# Patient Record
Sex: FEMALE | Race: WHITE | NOT HISPANIC OR LATINO | URBAN - METROPOLITAN AREA
[De-identification: names, ages, dates, MRNs, and addresses within clinical notes are randomized per-mention and may not be internally consistent; named-entity substitution may affect disease eponyms.]

---

## 2017-05-25 ENCOUNTER — OUTPATIENT (OUTPATIENT)
Dept: OUTPATIENT SERVICES | Facility: HOSPITAL | Age: 67
LOS: 1 days | Discharge: HOME | End: 2017-05-25

## 2017-06-28 DIAGNOSIS — Z13.820 ENCOUNTER FOR SCREENING FOR OSTEOPOROSIS: ICD-10-CM

## 2017-06-28 DIAGNOSIS — Z78.0 ASYMPTOMATIC MENOPAUSAL STATE: ICD-10-CM

## 2017-06-28 DIAGNOSIS — M81.0 AGE-RELATED OSTEOPOROSIS WITHOUT CURRENT PATHOLOGICAL FRACTURE: ICD-10-CM

## 2018-01-22 ENCOUNTER — OUTPATIENT (OUTPATIENT)
Dept: OUTPATIENT SERVICES | Facility: HOSPITAL | Age: 68
LOS: 1 days | Discharge: HOME | End: 2018-01-22

## 2018-01-22 DIAGNOSIS — R07.81 PLEURODYNIA: ICD-10-CM

## 2018-01-22 DIAGNOSIS — R05 COUGH: ICD-10-CM

## 2019-09-11 ENCOUNTER — OUTPATIENT (OUTPATIENT)
Dept: OUTPATIENT SERVICES | Facility: HOSPITAL | Age: 69
LOS: 1 days | Discharge: HOME | End: 2019-09-11
Payer: MEDICARE

## 2019-09-11 DIAGNOSIS — I83.813 VARICOSE VEINS OF BILATERAL LOWER EXTREMITIES WITH PAIN: ICD-10-CM

## 2019-09-11 DIAGNOSIS — R60.0 LOCALIZED EDEMA: ICD-10-CM

## 2019-09-11 PROCEDURE — 93970 EXTREMITY STUDY: CPT | Mod: 26

## 2019-11-30 ENCOUNTER — OUTPATIENT (OUTPATIENT)
Dept: OUTPATIENT SERVICES | Facility: HOSPITAL | Age: 69
LOS: 1 days | Discharge: HOME | End: 2019-11-30
Payer: MEDICARE

## 2019-11-30 DIAGNOSIS — Z12.31 ENCOUNTER FOR SCREENING MAMMOGRAM FOR MALIGNANT NEOPLASM OF BREAST: ICD-10-CM

## 2019-11-30 PROCEDURE — 77063 BREAST TOMOSYNTHESIS BI: CPT | Mod: 26

## 2019-11-30 PROCEDURE — 77067 SCR MAMMO BI INCL CAD: CPT | Mod: 26

## 2021-09-15 ENCOUNTER — OUTPATIENT (OUTPATIENT)
Dept: OUTPATIENT SERVICES | Facility: HOSPITAL | Age: 71
LOS: 1 days | Discharge: HOME | End: 2021-09-15
Payer: MEDICARE

## 2021-09-15 DIAGNOSIS — M54.5 LOW BACK PAIN: ICD-10-CM

## 2021-09-15 PROCEDURE — 72148 MRI LUMBAR SPINE W/O DYE: CPT | Mod: 26,MH

## 2022-01-24 PROBLEM — Z00.00 ENCOUNTER FOR PREVENTIVE HEALTH EXAMINATION: Status: ACTIVE | Noted: 2022-01-24

## 2022-01-27 ENCOUNTER — RESULT REVIEW (OUTPATIENT)
Age: 72
End: 2022-01-27

## 2022-01-27 ENCOUNTER — OUTPATIENT (OUTPATIENT)
Dept: OUTPATIENT SERVICES | Facility: HOSPITAL | Age: 72
LOS: 1 days | Discharge: HOME | End: 2022-01-27
Payer: MEDICARE

## 2022-01-27 DIAGNOSIS — Z12.31 ENCOUNTER FOR SCREENING MAMMOGRAM FOR MALIGNANT NEOPLASM OF BREAST: ICD-10-CM

## 2022-01-27 PROCEDURE — 77067 SCR MAMMO BI INCL CAD: CPT | Mod: 26

## 2022-01-27 PROCEDURE — 77063 BREAST TOMOSYNTHESIS BI: CPT | Mod: 26

## 2022-01-28 DIAGNOSIS — Z78.0 ASYMPTOMATIC MENOPAUSAL STATE: ICD-10-CM

## 2022-01-28 DIAGNOSIS — Z87.310 PERSONAL HISTORY OF (HEALED) OSTEOPOROSIS FRACTURE: ICD-10-CM

## 2022-01-28 DIAGNOSIS — Z13.820 ENCOUNTER FOR SCREENING FOR OSTEOPOROSIS: ICD-10-CM

## 2022-01-28 DIAGNOSIS — M81.0 AGE-RELATED OSTEOPOROSIS WITHOUT CURRENT PATHOLOGICAL FRACTURE: ICD-10-CM

## 2022-08-08 ENCOUNTER — NON-APPOINTMENT (OUTPATIENT)
Age: 72
End: 2022-08-08

## 2022-08-09 ENCOUNTER — TRANSCRIPTION ENCOUNTER (OUTPATIENT)
Age: 72
End: 2022-08-09

## 2022-08-12 ENCOUNTER — OUTPATIENT (OUTPATIENT)
Dept: INPATIENT UNIT | Facility: HOSPITAL | Age: 72
LOS: 1 days | Discharge: HOME | End: 2022-08-12

## 2022-08-12 ENCOUNTER — APPOINTMENT (OUTPATIENT)
Age: 72
End: 2022-08-12

## 2022-08-12 VITALS
OXYGEN SATURATION: 97 % | DIASTOLIC BLOOD PRESSURE: 60 MMHG | SYSTOLIC BLOOD PRESSURE: 119 MMHG | RESPIRATION RATE: 16 BRPM | TEMPERATURE: 99 F | HEART RATE: 82 BPM

## 2022-08-12 VITALS
DIASTOLIC BLOOD PRESSURE: 56 MMHG | RESPIRATION RATE: 16 BRPM | SYSTOLIC BLOOD PRESSURE: 115 MMHG | OXYGEN SATURATION: 97 % | HEART RATE: 80 BPM | TEMPERATURE: 99 F

## 2022-08-12 DIAGNOSIS — U07.1 COVID-19: ICD-10-CM

## 2022-08-12 RX ORDER — BEBTELOVIMAB 87.5 MG/ML
175 INJECTION, SOLUTION INTRAVENOUS ONCE
Refills: 0 | Status: COMPLETED | OUTPATIENT
Start: 2022-08-12 | End: 2022-08-12

## 2022-08-12 RX ADMIN — BEBTELOVIMAB 175 MILLIGRAM(S): 87.5 INJECTION, SOLUTION INTRAVENOUS at 09:00

## 2022-08-12 NOTE — CHART NOTE - NSCHARTNOTEFT_GEN_A_CORE
HPI: Patient is a 71yo Female w/ PMH of HTN, COPD not on home O2, scleroderma presented for monoclonal antibody treatment with Bebtelovimab today. Pt was tested positive 3 days ago, and has cough and HA currently. Allergic to PCN (rash) and vancomycin (anaphylaxis). Pt received 3 covid vaccines, last dosage not in past 14 days. Hemodynamically stable.       Allergies  penicillin (Rash)  vancomycin (Anaphylaxis)      T(C): 37.2 (12 Aug 2022 10:00), Max: 37.3 (12 Aug 2022 09:00)  T(F): 98.9 (12 Aug 2022 10:00), Max: 99.1 (12 Aug 2022 09:00)  HR: 80 (12 Aug 2022 10:00) (79 - 82)  BP: 115/56 (12 Aug 2022 10:00) (115/56 - 119/60)  RR: 16 (12 Aug 2022 10:00) (16 - 17)  SpO2: 97% (12 Aug 2022 10:00) (96% - 97%)      Physical Examination:  General: No acute distress.  Alert, oriented, interactive, nonfocal  PULM: Clear to auscultation bilaterally  CVS: Regular rate, no murmurs, rubs, or gallops  ABD: Soft, nondistended, nontender, normoactive bowel sounds, no masses  SKIN: Warm and well perfused, no rashes noted.        Plan:  I have reviewed the Bebtelovimab Emergency Use Authorization (EUA) and I have provided the patient or patient's caregiver with the following information:  1. FDA has authorized emergency use of Bebtelovimab which is not an FDA approved biological agent.  2. The patient or patient’s caregiver has the option to accept or refuse administration of Bebtelovimab  3. The significant known and potential risks and benefits of Bebtelovimab and the extent to which such risks and benefits are unknown.  4. Information on available alternative treatments and risks and benefits of those alternatives.  Informed consent was obtained. Answered all of patient's questions and concerns to their satisfaction. Patient verbalized understanding.  Monitor VS per protocol.  Insert peripheral IV.  Infuse NSS 25/mL IV continuously.  Administer Bebtelovimab IVP over 30 seconds.  Observe patient for 1 hour post infusion.    Disposition:  Patient tolerated infusion well, denies complaints of chest pain, shortness of breath, dizziness or palpitations. Discharge instructions given and fact sheet included.  Instructed patient to contact the call center or their PMD if they develop side effects at home.  Instructed the patient to call 911 and go to the emergency room if they develop symptoms of a severe allergic reaction. Patient verbalized understanding.  RN educated patient on the proper use of the incentive spirometer and the patient displayed return demonstration.  VSS and RN removed IV successfully.  Patient was discharged home in [facility].

## 2023-04-15 ENCOUNTER — NON-APPOINTMENT (OUTPATIENT)
Age: 73
End: 2023-04-15

## 2023-05-30 ENCOUNTER — RESULT CHARGE (OUTPATIENT)
Age: 73
End: 2023-05-30

## 2023-05-30 ENCOUNTER — APPOINTMENT (OUTPATIENT)
Dept: ORTHOPEDIC SURGERY | Facility: CLINIC | Age: 73
End: 2023-05-30
Payer: MEDICARE

## 2023-05-30 VITALS — HEIGHT: 60 IN | BODY MASS INDEX: 25.13 KG/M2 | WEIGHT: 128 LBS

## 2023-05-30 PROCEDURE — 73610 X-RAY EXAM OF ANKLE: CPT | Mod: RT

## 2023-05-30 PROCEDURE — 99203 OFFICE O/P NEW LOW 30 MIN: CPT

## 2023-05-30 PROCEDURE — 73630 X-RAY EXAM OF FOOT: CPT | Mod: RT

## 2023-05-30 NOTE — IMAGING
[de-identified] : On examination of the right foot and ankle, patient has generalized chronic swelling and deformity, patient has deformity due to previous surgeries to the foot and ankle.\par Patient has significant decreased range of motion, mild erythema and generalized swelling.\par Tenderness around the ankle joint.\par Able to apply weight over the right lower extremity.\par Sensation to light touch intact.\par \par X-ray of the right foot and right ankle were done in office today, this x-ray shows severe osteoarthritis to the ankle joint and hindfoot.  No acute fracture or dislocation noted.

## 2023-05-30 NOTE — DISCUSSION/SUMMARY
[de-identified] : IMPRESSION: Right ankle pain aggravated OA\par \par PLAN: Medrol dose pack\par ASPO brace\par Ice, rest\par \par FOLLOW-UP:2-3 weeks for the foot and ankle specialist\par \par \par
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
## 2023-06-14 ENCOUNTER — APPOINTMENT (OUTPATIENT)
Dept: ORTHOPEDIC SURGERY | Facility: CLINIC | Age: 73
End: 2023-06-14
Payer: MEDICARE

## 2023-06-14 VITALS — BODY MASS INDEX: 25.13 KG/M2 | HEIGHT: 60 IN | WEIGHT: 128 LBS

## 2023-06-14 DIAGNOSIS — S99.911A UNSPECIFIED INJURY OF RIGHT ANKLE, INITIAL ENCOUNTER: ICD-10-CM

## 2023-06-14 PROCEDURE — 99213 OFFICE O/P EST LOW 20 MIN: CPT

## 2023-06-14 NOTE — PHYSICAL EXAM
[de-identified] : She is alert oriented x3.  She is pleasant and cooperative that exam.  I examined her right lower extremity.  There is an almost circumferential laceration which is healed with surrounding the ankle.  She has a fixed equinus contracture at about 10 degrees shy of neutral.  There is no passive or active range of motion present at the ankle.

## 2023-06-14 NOTE — DISCUSSION/SUMMARY
[de-identified] : I had a lengthy discussion with the patient regarding her findings.  At this time I do not think any interventions warranted since she is improving.  However ultimately she might be a candidate for ultrasound-guided injections into the ankle given how narrowed her joint spaces.  She is in agreement with this plan.  All questions answered.
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
## 2023-07-20 ENCOUNTER — APPOINTMENT (OUTPATIENT)
Dept: ORTHOPEDIC SURGERY | Facility: CLINIC | Age: 73
End: 2023-07-20

## 2023-09-01 ENCOUNTER — NON-APPOINTMENT (OUTPATIENT)
Age: 73
End: 2023-09-01

## 2023-09-19 ENCOUNTER — APPOINTMENT (OUTPATIENT)
Dept: NEUROLOGY | Facility: CLINIC | Age: 73
End: 2023-09-19
Payer: MEDICARE

## 2023-09-19 VITALS
SYSTOLIC BLOOD PRESSURE: 124 MMHG | WEIGHT: 128 LBS | BODY MASS INDEX: 25.13 KG/M2 | HEART RATE: 82 BPM | DIASTOLIC BLOOD PRESSURE: 80 MMHG | HEIGHT: 60 IN

## 2023-09-19 DIAGNOSIS — M62.830 MUSCLE SPASM OF BACK: ICD-10-CM

## 2023-09-19 DIAGNOSIS — G43.709 CHRONIC MIGRAINE W/OUT AURA, NOT INTRACTABLE, W/OUT STATUS MIGRAINOSUS: ICD-10-CM

## 2023-09-19 PROCEDURE — 99213 OFFICE O/P EST LOW 20 MIN: CPT

## 2023-09-19 RX ORDER — DIGOXIN 125 UG/1
125 TABLET ORAL DAILY
Refills: 0 | Status: ACTIVE | COMMUNITY
Start: 2023-09-19

## 2023-10-04 ENCOUNTER — APPOINTMENT (OUTPATIENT)
Dept: CARDIOLOGY | Facility: CLINIC | Age: 73
End: 2023-10-04
Payer: MEDICARE

## 2023-10-04 VITALS
HEIGHT: 60 IN | HEART RATE: 75 BPM | BODY MASS INDEX: 25.13 KG/M2 | WEIGHT: 128 LBS | SYSTOLIC BLOOD PRESSURE: 120 MMHG | OXYGEN SATURATION: 93 % | DIASTOLIC BLOOD PRESSURE: 60 MMHG

## 2023-10-04 PROCEDURE — 93000 ELECTROCARDIOGRAM COMPLETE: CPT

## 2023-10-04 PROCEDURE — 99205 OFFICE O/P NEW HI 60 MIN: CPT | Mod: 25

## 2023-10-04 PROCEDURE — 99204 OFFICE O/P NEW MOD 45 MIN: CPT | Mod: 25

## 2023-10-04 RX ORDER — AMBRISENTAN 10 MG/1
10 TABLET, FILM COATED ORAL DAILY
Refills: 0 | Status: ACTIVE | COMMUNITY

## 2023-10-04 RX ORDER — TADALAFIL 20 MG/1
20 TABLET ORAL TWICE DAILY
Refills: 0 | Status: ACTIVE | COMMUNITY

## 2023-10-04 RX ORDER — SPIRONOLACTONE 50 MG/1
50 TABLET ORAL DAILY
Refills: 0 | Status: ACTIVE | COMMUNITY

## 2023-10-04 RX ORDER — HYDROXYCHLOROQUINE SULFATE 200 MG/1
200 TABLET, FILM COATED ORAL TWICE DAILY
Refills: 0 | Status: ACTIVE | COMMUNITY

## 2023-10-04 RX ORDER — METHYLPREDNISOLONE 4 MG/1
4 TABLET ORAL
Qty: 1 | Refills: 0 | Status: DISCONTINUED | COMMUNITY
Start: 2023-05-30 | End: 2023-10-04

## 2023-10-04 RX ORDER — ALPRAZOLAM 0.25 MG/1
0.25 TABLET ORAL TWICE DAILY
Refills: 0 | Status: ACTIVE | COMMUNITY

## 2023-10-04 RX ORDER — METHOCARBAMOL 500 MG/1
500 TABLET, FILM COATED ORAL 3 TIMES DAILY
Qty: 90 | Refills: 0 | Status: DISCONTINUED | COMMUNITY
Start: 2023-09-19 | End: 2023-10-04

## 2023-10-04 RX ORDER — FLUTICASONE FUROATE, UMECLIDINIUM BROMIDE AND VILANTEROL TRIFENATATE 100; 62.5; 25 UG/1; UG/1; UG/1
100-62.5-25 POWDER RESPIRATORY (INHALATION) DAILY
Refills: 0 | Status: ACTIVE | COMMUNITY

## 2023-10-05 LAB
ALBUMIN SERPL ELPH-MCNC: 4.7 G/DL
ALP BLD-CCNC: 86 U/L
ALT SERPL-CCNC: 10 U/L
ANION GAP SERPL CALC-SCNC: 12 MMOL/L
AST SERPL-CCNC: 20 U/L
BILIRUB SERPL-MCNC: <0.2 MG/DL
BUN SERPL-MCNC: 21 MG/DL
CALCIUM SERPL-MCNC: 9.8 MG/DL
CHLORIDE SERPL-SCNC: 102 MMOL/L
CO2 SERPL-SCNC: 25 MMOL/L
CREAT SERPL-MCNC: 0.8 MG/DL
EGFR: 77 ML/MIN/1.73M2
FERRITIN SERPL-MCNC: 25 NG/ML
GLUCOSE SERPL-MCNC: 91 MG/DL
HCT VFR BLD CALC: 35 %
HGB BLD-MCNC: 10.5 G/DL
IRON SATN MFR SERPL: 34 %
IRON SERPL-MCNC: 111 UG/DL
MAGNESIUM SERPL-MCNC: 2.2 MG/DL
MCHC RBC-ENTMCNC: 29.8 PG
MCHC RBC-ENTMCNC: 30 G/DL
MCV RBC AUTO: 99.4 FL
NT-PROBNP SERPL-MCNC: 176 PG/ML
PLATELET # BLD AUTO: 209 K/UL
PMV BLD: 11.1 FL
POTASSIUM SERPL-SCNC: 4.1 MMOL/L
PROT SERPL-MCNC: 7 G/DL
RBC # BLD: 3.52 M/UL
RBC # FLD: 16.1 %
SODIUM SERPL-SCNC: 139 MMOL/L
TIBC SERPL-MCNC: 322 UG/DL
TRANSFERRIN SERPL-MCNC: 259 MG/DL
UIBC SERPL-MCNC: 211 UG/DL
WBC # FLD AUTO: 4.39 K/UL

## 2023-11-01 ENCOUNTER — APPOINTMENT (OUTPATIENT)
Dept: HEMATOLOGY ONCOLOGY | Facility: CLINIC | Age: 73
End: 2023-11-01
Payer: MEDICARE

## 2023-11-01 ENCOUNTER — OUTPATIENT (OUTPATIENT)
Dept: OUTPATIENT SERVICES | Facility: HOSPITAL | Age: 73
LOS: 1 days | End: 2023-11-01
Payer: MEDICARE

## 2023-11-01 ENCOUNTER — APPOINTMENT (OUTPATIENT)
Dept: INFUSION THERAPY | Facility: CLINIC | Age: 73
End: 2023-11-01
Payer: MEDICARE

## 2023-11-01 VITALS
RESPIRATION RATE: 18 BRPM | SYSTOLIC BLOOD PRESSURE: 127 MMHG | WEIGHT: 128 LBS | HEART RATE: 87 BPM | HEIGHT: 60 IN | DIASTOLIC BLOOD PRESSURE: 78 MMHG | TEMPERATURE: 98.6 F | BODY MASS INDEX: 25.13 KG/M2

## 2023-11-01 DIAGNOSIS — D64.9 ANEMIA, UNSPECIFIED: ICD-10-CM

## 2023-11-01 DIAGNOSIS — Z79.899 OTHER LONG TERM (CURRENT) DRUG THERAPY: ICD-10-CM

## 2023-11-01 DIAGNOSIS — Z78.9 OTHER SPECIFIED HEALTH STATUS: ICD-10-CM

## 2023-11-01 PROCEDURE — 99204 OFFICE O/P NEW MOD 45 MIN: CPT

## 2023-11-01 PROCEDURE — 99204 OFFICE O/P NEW MOD 45 MIN: CPT | Mod: 25

## 2023-11-01 PROCEDURE — 96365 THER/PROPH/DIAG IV INF INIT: CPT

## 2023-11-01 RX ORDER — IRON SUCROSE 20 MG/ML
200 INJECTION, SOLUTION INTRAVENOUS ONCE
Refills: 0 | Status: COMPLETED | OUTPATIENT
Start: 2023-11-01 | End: 2023-11-01

## 2023-11-01 RX ADMIN — IRON SUCROSE 110 MILLIGRAM(S): 20 INJECTION, SOLUTION INTRAVENOUS at 17:15

## 2023-11-01 RX ADMIN — IRON SUCROSE 200 MILLIGRAM(S): 20 INJECTION, SOLUTION INTRAVENOUS at 18:15

## 2023-11-02 DIAGNOSIS — D64.9 ANEMIA, UNSPECIFIED: ICD-10-CM

## 2023-11-09 ENCOUNTER — OUTPATIENT (OUTPATIENT)
Dept: OUTPATIENT SERVICES | Facility: HOSPITAL | Age: 73
LOS: 1 days | End: 2023-11-09
Payer: MEDICARE

## 2023-11-09 ENCOUNTER — APPOINTMENT (OUTPATIENT)
Dept: INFUSION THERAPY | Facility: CLINIC | Age: 73
End: 2023-11-09

## 2023-11-09 VITALS
RESPIRATION RATE: 18 BRPM | DIASTOLIC BLOOD PRESSURE: 63 MMHG | SYSTOLIC BLOOD PRESSURE: 140 MMHG | TEMPERATURE: 98.1 F | OXYGEN SATURATION: 95 % | HEART RATE: 79 BPM

## 2023-11-09 DIAGNOSIS — D64.9 ANEMIA, UNSPECIFIED: ICD-10-CM

## 2023-11-09 PROCEDURE — 96365 THER/PROPH/DIAG IV INF INIT: CPT

## 2023-11-09 RX ORDER — IRON SUCROSE 20 MG/ML
200 INJECTION, SOLUTION INTRAVENOUS ONCE
Refills: 0 | Status: COMPLETED | OUTPATIENT
Start: 2023-11-09 | End: 2023-11-09

## 2023-11-09 RX ADMIN — IRON SUCROSE 200 MILLIGRAM(S): 20 INJECTION, SOLUTION INTRAVENOUS at 12:20

## 2023-11-09 RX ADMIN — IRON SUCROSE 110 MILLIGRAM(S): 20 INJECTION, SOLUTION INTRAVENOUS at 11:40

## 2023-11-14 ENCOUNTER — APPOINTMENT (OUTPATIENT)
Dept: NEUROLOGY | Facility: CLINIC | Age: 73
End: 2023-11-14

## 2023-11-16 ENCOUNTER — APPOINTMENT (OUTPATIENT)
Dept: INFUSION THERAPY | Facility: CLINIC | Age: 73
End: 2023-11-16

## 2023-11-16 ENCOUNTER — OUTPATIENT (OUTPATIENT)
Dept: OUTPATIENT SERVICES | Facility: HOSPITAL | Age: 73
LOS: 1 days | End: 2023-11-16
Payer: MEDICARE

## 2023-11-16 VITALS
DIASTOLIC BLOOD PRESSURE: 81 MMHG | OXYGEN SATURATION: 97 % | RESPIRATION RATE: 18 BRPM | HEART RATE: 78 BPM | SYSTOLIC BLOOD PRESSURE: 115 MMHG | TEMPERATURE: 97.8 F

## 2023-11-16 DIAGNOSIS — D64.9 ANEMIA, UNSPECIFIED: ICD-10-CM

## 2023-11-16 PROCEDURE — 96365 THER/PROPH/DIAG IV INF INIT: CPT

## 2023-11-16 RX ORDER — IRON SUCROSE 20 MG/ML
200 INJECTION, SOLUTION INTRAVENOUS ONCE
Refills: 0 | Status: COMPLETED | OUTPATIENT
Start: 2023-11-16 | End: 2023-11-16

## 2023-11-16 RX ADMIN — IRON SUCROSE 110 MILLIGRAM(S): 20 INJECTION, SOLUTION INTRAVENOUS at 11:19

## 2023-11-16 RX ADMIN — IRON SUCROSE 200 MILLIGRAM(S): 20 INJECTION, SOLUTION INTRAVENOUS at 12:21

## 2023-11-21 ENCOUNTER — APPOINTMENT (OUTPATIENT)
Dept: INFUSION THERAPY | Facility: CLINIC | Age: 73
End: 2023-11-21

## 2023-11-21 ENCOUNTER — OUTPATIENT (OUTPATIENT)
Dept: OUTPATIENT SERVICES | Facility: HOSPITAL | Age: 73
LOS: 1 days | End: 2023-11-21
Payer: MEDICARE

## 2023-11-21 VITALS — TEMPERATURE: 98 F | HEART RATE: 79 BPM | SYSTOLIC BLOOD PRESSURE: 115 MMHG | DIASTOLIC BLOOD PRESSURE: 58 MMHG

## 2023-11-21 DIAGNOSIS — D64.9 ANEMIA, UNSPECIFIED: ICD-10-CM

## 2023-11-21 PROCEDURE — 96365 THER/PROPH/DIAG IV INF INIT: CPT

## 2023-11-21 RX ORDER — IRON SUCROSE 20 MG/ML
200 INJECTION, SOLUTION INTRAVENOUS ONCE
Refills: 0 | Status: COMPLETED | OUTPATIENT
Start: 2023-11-21 | End: 2023-11-21

## 2023-11-21 RX ADMIN — IRON SUCROSE 200 MILLIGRAM(S): 20 INJECTION, SOLUTION INTRAVENOUS at 10:08

## 2023-11-21 RX ADMIN — IRON SUCROSE 110 MILLIGRAM(S): 20 INJECTION, SOLUTION INTRAVENOUS at 09:38

## 2023-11-30 ENCOUNTER — APPOINTMENT (OUTPATIENT)
Dept: INFUSION THERAPY | Facility: CLINIC | Age: 73
End: 2023-11-30

## 2023-11-30 ENCOUNTER — OUTPATIENT (OUTPATIENT)
Dept: OUTPATIENT SERVICES | Facility: HOSPITAL | Age: 73
LOS: 1 days | End: 2023-11-30
Payer: MEDICARE

## 2023-11-30 VITALS — HEART RATE: 71 BPM | DIASTOLIC BLOOD PRESSURE: 62 MMHG | TEMPERATURE: 98 F | SYSTOLIC BLOOD PRESSURE: 132 MMHG

## 2023-11-30 DIAGNOSIS — D64.9 ANEMIA, UNSPECIFIED: ICD-10-CM

## 2023-11-30 PROCEDURE — 96365 THER/PROPH/DIAG IV INF INIT: CPT

## 2023-11-30 RX ORDER — IRON SUCROSE 20 MG/ML
200 INJECTION, SOLUTION INTRAVENOUS ONCE
Refills: 0 | Status: COMPLETED | OUTPATIENT
Start: 2023-11-30 | End: 2023-11-30

## 2023-11-30 RX ADMIN — IRON SUCROSE 110 MILLIGRAM(S): 20 INJECTION, SOLUTION INTRAVENOUS at 11:43

## 2023-11-30 RX ADMIN — IRON SUCROSE 200 MILLIGRAM(S): 20 INJECTION, SOLUTION INTRAVENOUS at 12:45

## 2024-01-02 ENCOUNTER — OUTPATIENT (OUTPATIENT)
Dept: OUTPATIENT SERVICES | Facility: HOSPITAL | Age: 74
LOS: 1 days | End: 2024-01-02
Payer: MEDICARE

## 2024-01-02 ENCOUNTER — LABORATORY RESULT (OUTPATIENT)
Age: 74
End: 2024-01-02

## 2024-01-02 ENCOUNTER — APPOINTMENT (OUTPATIENT)
Dept: HEMATOLOGY ONCOLOGY | Facility: CLINIC | Age: 74
End: 2024-01-02

## 2024-01-02 DIAGNOSIS — D64.9 ANEMIA, UNSPECIFIED: ICD-10-CM

## 2024-01-02 LAB
HCT VFR BLD CALC: 30 %
HGB BLD-MCNC: 9.6 G/DL
MCHC RBC-ENTMCNC: 30.1 PG
MCHC RBC-ENTMCNC: 32 G/DL
MCV RBC AUTO: 94 FL
PLATELET # BLD AUTO: 164 K/UL
PMV BLD: 10.7 FL
RBC # BLD: 3.19 M/UL
RBC # FLD: 15.1 %
WBC # FLD AUTO: 4.13 K/UL

## 2024-01-02 PROCEDURE — 82728 ASSAY OF FERRITIN: CPT

## 2024-01-02 PROCEDURE — 85027 COMPLETE CBC AUTOMATED: CPT

## 2024-01-02 PROCEDURE — 36415 COLL VENOUS BLD VENIPUNCTURE: CPT

## 2024-01-02 PROCEDURE — 83540 ASSAY OF IRON: CPT

## 2024-01-02 PROCEDURE — 83550 IRON BINDING TEST: CPT

## 2024-01-03 DIAGNOSIS — D64.9 ANEMIA, UNSPECIFIED: ICD-10-CM

## 2024-01-03 LAB
FERRITIN SERPL-MCNC: 60 NG/ML
IRON SATN MFR SERPL: 18 %
IRON SERPL-MCNC: 54 UG/DL
TIBC SERPL-MCNC: 294 UG/DL
UIBC SERPL-MCNC: 240 UG/DL

## 2024-01-05 ENCOUNTER — APPOINTMENT (OUTPATIENT)
Dept: CARDIOLOGY | Facility: CLINIC | Age: 74
End: 2024-01-05
Payer: MEDICARE

## 2024-01-05 VITALS
WEIGHT: 128 LBS | HEART RATE: 84 BPM | SYSTOLIC BLOOD PRESSURE: 110 MMHG | HEIGHT: 60 IN | BODY MASS INDEX: 25.13 KG/M2 | DIASTOLIC BLOOD PRESSURE: 60 MMHG

## 2024-01-05 DIAGNOSIS — R53.83 OTHER FATIGUE: ICD-10-CM

## 2024-01-05 DIAGNOSIS — D50.9 IRON DEFICIENCY ANEMIA, UNSPECIFIED: ICD-10-CM

## 2024-01-05 PROCEDURE — 93000 ELECTROCARDIOGRAM COMPLETE: CPT

## 2024-01-05 PROCEDURE — 99204 OFFICE O/P NEW MOD 45 MIN: CPT | Mod: 25

## 2024-01-05 RX ORDER — FUROSEMIDE 20 MG/1
20 TABLET ORAL DAILY
Refills: 0 | Status: ACTIVE | COMMUNITY

## 2024-01-10 PROBLEM — D50.9 ANEMIA, IRON DEFICIENCY: Status: ACTIVE | Noted: 2023-10-04

## 2024-01-10 PROBLEM — R53.83 FATIGUE, UNSPECIFIED TYPE: Status: ACTIVE | Noted: 2023-10-05

## 2024-01-10 NOTE — CARDIOLOGY SUMMARY
[de-identified] : ECHO 7/15/23 LVEF 55-60%, no pericardial effusion, RA/RV normal size and function

## 2024-01-10 NOTE — ASSESSMENT
[FreeTextEntry1] : Pulmonary arterial HTN/ Anemia/ Scleroderma  WHO Group I FC-3 Euvolemic on exam POCUS IVC collapsing Pro-BNP 10/4/23 176 Continue Furosemide to 20 mg daily If weight gain of 2 lbs or more in a day, take an extra tablet for that day Continue Ambrisentan 10 mg daily Continue Spironolactone 50 mg daily Continue Tadalafil 20 mg twice daily s/p Venofer weekly infusions X 5 Ferritin and TIBC improved, Hb 9.6  Restart on ferrous sulfate tablets as she is not interested in getting any more iv iron infusions   f/u Iron studies in the next visit RTO in 6 months   Kailash Al PGY-1 Internal Medicine  Mayra Matias MD, FACC, [facility]  Advanced Heart Failure/ Mechanical Circulatory Support Pulmonary Hypertension and Cardiac Amyloidosis  [facility]    WDL

## 2024-01-10 NOTE — HISTORY OF PRESENT ILLNESS
[FreeTextEntry1] : 74 y/o female with a PMHx of Pulmonary arterial hypertension, scleroderma, and Anemia. She is here for follow up for Pulm HTN.   The patient reports being able to walk less than a block without getting short of breath. This is about her baseline. She reports tolerating her medications well. Her main limitation is right ankle and knee pain. Her SBP at home stays at 110s, and her weight fluctuates from 125 to 128 lbs. The patient denies experiencing any chest pain, bleeding, shortness of breath at rest, paroxysmal nocturnal dyspnea (PND), abdominal discomfort, lightheadedness/dizziness, bilateral lower extremity edema, syncope. She endorses palpitations at night which she attributes to anxiety and reports feeling better with Xanax.

## 2024-07-02 ENCOUNTER — APPOINTMENT (OUTPATIENT)
Dept: PULMONOLOGY | Facility: CLINIC | Age: 74
End: 2024-07-02
Payer: MEDICARE

## 2024-07-02 VITALS
HEART RATE: 82 BPM | SYSTOLIC BLOOD PRESSURE: 122 MMHG | DIASTOLIC BLOOD PRESSURE: 84 MMHG | HEIGHT: 60 IN | BODY MASS INDEX: 25.13 KG/M2 | WEIGHT: 128 LBS | OXYGEN SATURATION: 92 %

## 2024-07-02 DIAGNOSIS — G47.33 OBSTRUCTIVE SLEEP APNEA (ADULT) (PEDIATRIC): ICD-10-CM

## 2024-07-02 DIAGNOSIS — M34.9 SYSTEMIC SCLEROSIS, UNSPECIFIED: ICD-10-CM

## 2024-07-02 DIAGNOSIS — Z87.891 PERSONAL HISTORY OF NICOTINE DEPENDENCE: ICD-10-CM

## 2024-07-02 DIAGNOSIS — Z86.2 PERSONAL HISTORY OF DISEASES OF THE BLOOD AND BLOOD-FORMING ORGANS AND CERTAIN DISORDERS INVOLVING THE IMMUNE MECHANISM: ICD-10-CM

## 2024-07-02 DIAGNOSIS — I27.20 PULMONARY HYPERTENSION, UNSPECIFIED: ICD-10-CM

## 2024-07-02 PROCEDURE — 99214 OFFICE O/P EST MOD 30 MIN: CPT

## 2024-07-02 PROCEDURE — 99204 OFFICE O/P NEW MOD 45 MIN: CPT

## 2024-08-22 ENCOUNTER — APPOINTMENT (OUTPATIENT)
Dept: ORTHOPEDIC SURGERY | Facility: CLINIC | Age: 74
End: 2024-08-22
Payer: MEDICARE

## 2024-08-22 VITALS — BODY MASS INDEX: 24.17 KG/M2 | HEIGHT: 61 IN | WEIGHT: 128 LBS

## 2024-08-22 DIAGNOSIS — M11.269 OTHER CHONDROCALCINOSIS, UNSPECIFIED KNEE: ICD-10-CM

## 2024-08-22 PROCEDURE — 99213 OFFICE O/P EST LOW 20 MIN: CPT

## 2024-08-22 PROCEDURE — 73562 X-RAY EXAM OF KNEE 3: CPT | Mod: 50

## 2024-08-22 NOTE — DATA REVIEWED
"Daily Note     Today's date: 2024  Patient name: Makayla Ruano  : 1960  MRN: [id number]  Referring provider: Charlie Pat MD  Dx:   Encounter Diagnosis     ICD-10-CM    1. Sprain of anterior cruciate ligament of left knee, subsequent encounter  S83.512D       2. Primary osteoarthritis of right knee  M17.11           Start Time: 0800  Stop Time: 0845  Total time in clinic (min): 45 minutes    Subjective: Pt had some stiffness following  appointment. [place] and then had a hard time sleeping at night. She tried tylenol for arthritis however, it did not improve her pain and she believes it to not be as strong as regular tylenol.       Objective: See treatment diary below      Assessment: Added HS stretch, LAQs, and SLRs, which patient tolerated well. During Continued current POC only by increasing resistance or repetitions as outlined below. Tolerated treatment well. Patient demonstrated fatigue post treatment      Plan: Continue per plan of care.      POC expires Unit limit Auth Expiration date PT/OT + Visit Limit?   24 4  60 max                            Visit/Unit Tracking  AUTH Status:  Date 8/15 8/19 8/22            No auth needed  Used 1 2 3             Remaining  59 58 57             FOTO 57/70                    Precautions:       Manuals                                                                Neuro Re-Ed             Cone slides              Reactive cone slides                           TherEx              TM walking 2' 2' 5'  8'  10'       SLRs  X20            Heel slides x30 X30            HS sets at varying angles 90*, 60*  X15 10\"  90*, 60*  X15 10\"            Quad sets X15 10\" X10 10\"            bridges 2x10  X20            sidestepping 2x30ft  2x30ft blue ankles            Monster walks  2x30ft  2x30ft blue ankles            LAQs   2# 2x10            HS curls  2# 2x10  2# 2x10            Calf stretch  4x30\" 4x30\"                      " [FreeTextEntry1] : I reviewed x-rays of the patient's right ankle.  They reveal a autofused hindfoot complex.  There is severe ankle DJD.   Ther Activity             Activities to emulate dancing - cone taps              Agility ladder              Gait Training             Resisted walking jessee             Resisted walking multi-direction             Modalities

## 2024-08-22 NOTE — DISCUSSION/SUMMARY
[de-identified] : Chief complaint: Bilateral knee pain  HPI: Patient is a 74-year-old female who presents the office today accompanied by her daughter for the evaluation of bilateral knee pain.  She reports that she does have chronic knee pain to the left knee however over the past 8 days she has experienced increased pain to both the left knee and the right knee without any known fall, injury, or trauma.  She localizes the pain to the diffuse bilateral knees.  She reports that she has the pain constantly but it varies in intensity.  She does report that she has a known history of rheumatoid arthritis.  She has morning stiffness to the bilateral knees which tends to get better with motion and activity shortly after waking.  She does feel as though her knee is might buckle on her at times however she denies any actual buckling of the knees.  She has been taking Tylenol arthritis with some relief.  She does have a history of an unspecified surgical intervention to the left knee many years ago.  ROS: Positive for bilateral knee pain  Physical examination of the bilateral knees:  No erythema bilaterally no ecchymosis bilaterally No edema bilaterally Skin is intact bilaterally Patient is able to perform straight leg raise bilaterally Active range of motion knee flexion from 0 to 120 degrees bilaterally Bilateral knee crepitus with knee flexion  Tenderness upon medial and lateral joint lines bilaterally No appreciable laxity with varus and valgus stress bilaterally Negative anterior drawer bilaterally Negative Neftali's bilaterally Calves are soft and nontender bilaterally  Three-view x-rays of the bilateral knees performed the office today show No obvious acute fracture, subluxation, or dislocation, chondrocalcinosis to the bilateral knees, patellofemoral degenerative changes to the right knee, tricompartmental degenerative changes most pronounced in the medial and patellofemoral compartments of the left knee  Assessment/plan: Chondrocalcinosis of the bilateral knees, discussed treatment options with the patient, patient is unable to take oral NSAIDs according to her pulmonologist, she can continue to take over-the-counter Tylenol on an as-needed basis for pain, discussed the possibility of a bilateral knee intra-articular corticosteroid injection today over the patient kindly deferred, patient was provided with a prescription for formal physical therapy for the bilateral knees so that she can get started on that, she can elevate the bilateral knees and apply ice to the bilateral knees on an as-needed basis with sensory precautions for pain, patient can apply bilateral neoprene knee sleeves for comfort/support on an as-needed basis while active, recommend that these be removed during rest, discussed maintaining a healthy weight and diet, patient will be provided with an 8-week follow-up with me for repeat evaluation, she verbalized understanding of all findings in the office today and agrees to follow-up as directed

## 2024-10-24 ENCOUNTER — APPOINTMENT (OUTPATIENT)
Dept: ORTHOPEDIC SURGERY | Facility: CLINIC | Age: 74
End: 2024-10-24

## 2024-10-29 ENCOUNTER — APPOINTMENT (OUTPATIENT)
Dept: HEART FAILURE | Facility: CLINIC | Age: 74
End: 2024-10-29
Payer: MEDICARE

## 2024-10-29 VITALS
SYSTOLIC BLOOD PRESSURE: 114 MMHG | HEART RATE: 79 BPM | OXYGEN SATURATION: 90 % | DIASTOLIC BLOOD PRESSURE: 72 MMHG | WEIGHT: 128 LBS | HEIGHT: 61 IN | BODY MASS INDEX: 24.17 KG/M2

## 2024-10-29 DIAGNOSIS — I27.20 PULMONARY HYPERTENSION, UNSPECIFIED: ICD-10-CM

## 2024-10-29 PROCEDURE — G2211 COMPLEX E/M VISIT ADD ON: CPT

## 2024-10-29 PROCEDURE — 99215 OFFICE O/P EST HI 40 MIN: CPT

## 2024-10-29 PROCEDURE — 93000 ELECTROCARDIOGRAM COMPLETE: CPT

## 2024-11-19 ENCOUNTER — APPOINTMENT (OUTPATIENT)
Dept: PULMONOLOGY | Facility: CLINIC | Age: 74
End: 2024-11-19
Payer: MEDICARE

## 2024-11-19 VITALS
HEART RATE: 81 BPM | OXYGEN SATURATION: 93 % | WEIGHT: 130 LBS | SYSTOLIC BLOOD PRESSURE: 110 MMHG | BODY MASS INDEX: 24.55 KG/M2 | HEIGHT: 61 IN | RESPIRATION RATE: 14 BRPM | DIASTOLIC BLOOD PRESSURE: 66 MMHG

## 2024-11-19 DIAGNOSIS — R09.82 POSTNASAL DRIP: ICD-10-CM

## 2024-11-19 DIAGNOSIS — G47.33 OBSTRUCTIVE SLEEP APNEA (ADULT) (PEDIATRIC): ICD-10-CM

## 2024-11-19 DIAGNOSIS — I27.20 PULMONARY HYPERTENSION, UNSPECIFIED: ICD-10-CM

## 2024-11-19 DIAGNOSIS — M34.9 SYSTEMIC SCLEROSIS, UNSPECIFIED: ICD-10-CM

## 2024-11-19 PROCEDURE — 99214 OFFICE O/P EST MOD 30 MIN: CPT

## 2024-11-19 PROCEDURE — G2211 COMPLEX E/M VISIT ADD ON: CPT

## 2024-11-19 RX ORDER — AZELASTINE HYDROCHLORIDE 137 UG/1
0.1 SPRAY, METERED NASAL
Qty: 1 | Refills: 3 | Status: ACTIVE | COMMUNITY
Start: 2024-11-19 | End: 1900-01-01

## 2024-12-02 ENCOUNTER — APPOINTMENT (OUTPATIENT)
Dept: CARDIOLOGY | Facility: CLINIC | Age: 74
End: 2024-12-02

## 2024-12-02 PROCEDURE — 93306 TTE W/DOPPLER COMPLETE: CPT

## 2025-01-07 ENCOUNTER — OUTPATIENT (OUTPATIENT)
Dept: OUTPATIENT SERVICES | Facility: HOSPITAL | Age: 75
LOS: 1 days | End: 2025-01-07
Payer: MEDICARE

## 2025-01-07 ENCOUNTER — RESULT REVIEW (OUTPATIENT)
Age: 75
End: 2025-01-07

## 2025-01-07 PROCEDURE — 71046 X-RAY EXAM CHEST 2 VIEWS: CPT | Mod: 26

## 2025-01-07 PROCEDURE — 71046 X-RAY EXAM CHEST 2 VIEWS: CPT

## 2025-01-08 DIAGNOSIS — R05.9 COUGH, UNSPECIFIED: ICD-10-CM

## 2025-01-14 ENCOUNTER — APPOINTMENT (OUTPATIENT)
Dept: ORTHOPEDIC SURGERY | Facility: CLINIC | Age: 75
End: 2025-01-14

## 2025-01-14 VITALS — BODY MASS INDEX: 24.17 KG/M2 | WEIGHT: 128 LBS | HEIGHT: 61 IN

## 2025-01-14 DIAGNOSIS — G56.02 CARPAL TUNNEL SYNDROME, LEFT UPPER LIMB: ICD-10-CM

## 2025-01-14 DIAGNOSIS — G56.01 CARPAL TUNNEL SYNDROME, RIGHT UPPER LIMB: ICD-10-CM

## 2025-01-14 PROCEDURE — 99202 OFFICE O/P NEW SF 15 MIN: CPT

## 2025-04-24 ENCOUNTER — RESULT REVIEW (OUTPATIENT)
Age: 75
End: 2025-04-24

## 2025-04-24 ENCOUNTER — OUTPATIENT (OUTPATIENT)
Dept: OUTPATIENT SERVICES | Facility: HOSPITAL | Age: 75
LOS: 1 days | End: 2025-04-24
Payer: MEDICARE

## 2025-04-24 DIAGNOSIS — Z00.8 ENCOUNTER FOR OTHER GENERAL EXAMINATION: ICD-10-CM

## 2025-04-24 PROCEDURE — 76770 US EXAM ABDO BACK WALL COMP: CPT

## 2025-04-24 PROCEDURE — 76770 US EXAM ABDO BACK WALL COMP: CPT | Mod: 26

## 2025-05-01 ENCOUNTER — INPATIENT (INPATIENT)
Facility: HOSPITAL | Age: 75
LOS: 3 days | Discharge: HOME | DRG: 690 | End: 2025-05-05
Attending: INTERNAL MEDICINE | Admitting: STUDENT IN AN ORGANIZED HEALTH CARE EDUCATION/TRAINING PROGRAM
Payer: MEDICARE

## 2025-05-01 VITALS
HEART RATE: 73 BPM | SYSTOLIC BLOOD PRESSURE: 145 MMHG | TEMPERATURE: 98 F | DIASTOLIC BLOOD PRESSURE: 65 MMHG | HEIGHT: 61 IN | RESPIRATION RATE: 70 BRPM | OXYGEN SATURATION: 94 % | WEIGHT: 126.99 LBS

## 2025-05-01 DIAGNOSIS — N39.0 URINARY TRACT INFECTION, SITE NOT SPECIFIED: ICD-10-CM

## 2025-05-01 LAB
ALBUMIN SERPL ELPH-MCNC: 4.1 G/DL — SIGNIFICANT CHANGE UP (ref 3.5–5.2)
ALP SERPL-CCNC: 84 U/L — SIGNIFICANT CHANGE UP (ref 30–115)
ALT FLD-CCNC: 13 U/L — SIGNIFICANT CHANGE UP (ref 0–41)
ANION GAP SERPL CALC-SCNC: 10 MMOL/L — SIGNIFICANT CHANGE UP (ref 7–14)
ANION GAP SERPL CALC-SCNC: 11 MMOL/L — SIGNIFICANT CHANGE UP (ref 7–14)
APPEARANCE UR: CLEAR — SIGNIFICANT CHANGE UP
AST SERPL-CCNC: 21 U/L — SIGNIFICANT CHANGE UP (ref 0–41)
BASOPHILS # BLD AUTO: 0.04 K/UL — SIGNIFICANT CHANGE UP (ref 0–0.2)
BASOPHILS NFR BLD AUTO: 1.1 % — HIGH (ref 0–1)
BILIRUB SERPL-MCNC: <0.2 MG/DL — SIGNIFICANT CHANGE UP (ref 0.2–1.2)
BILIRUB UR-MCNC: NEGATIVE — SIGNIFICANT CHANGE UP
BUN SERPL-MCNC: 10 MG/DL — SIGNIFICANT CHANGE UP (ref 10–20)
BUN SERPL-MCNC: 11 MG/DL — SIGNIFICANT CHANGE UP (ref 10–20)
CALCIUM SERPL-MCNC: 8.8 MG/DL — SIGNIFICANT CHANGE UP (ref 8.4–10.5)
CALCIUM SERPL-MCNC: 8.9 MG/DL — SIGNIFICANT CHANGE UP (ref 8.4–10.5)
CHLORIDE SERPL-SCNC: 83 MMOL/L — LOW (ref 98–110)
CHLORIDE SERPL-SCNC: 87 MMOL/L — LOW (ref 98–110)
CO2 SERPL-SCNC: 22 MMOL/L — SIGNIFICANT CHANGE UP (ref 17–32)
CO2 SERPL-SCNC: 23 MMOL/L — SIGNIFICANT CHANGE UP (ref 17–32)
COLOR SPEC: YELLOW — SIGNIFICANT CHANGE UP
CREAT ?TM UR-MCNC: 46 MG/DL — SIGNIFICANT CHANGE UP
CREAT SERPL-MCNC: 0.5 MG/DL — LOW (ref 0.7–1.5)
CREAT SERPL-MCNC: 0.6 MG/DL — LOW (ref 0.7–1.5)
DIFF PNL FLD: NEGATIVE — SIGNIFICANT CHANGE UP
EGFR: 93 ML/MIN/1.73M2 — SIGNIFICANT CHANGE UP
EGFR: 93 ML/MIN/1.73M2 — SIGNIFICANT CHANGE UP
EGFR: 97 ML/MIN/1.73M2 — SIGNIFICANT CHANGE UP
EGFR: 97 ML/MIN/1.73M2 — SIGNIFICANT CHANGE UP
EOSINOPHIL # BLD AUTO: 0.08 K/UL — SIGNIFICANT CHANGE UP (ref 0–0.7)
EOSINOPHIL NFR BLD AUTO: 2.3 % — SIGNIFICANT CHANGE UP (ref 0–8)
GAS PNL BLDV: SIGNIFICANT CHANGE UP
GLUCOSE SERPL-MCNC: 102 MG/DL — HIGH (ref 70–99)
GLUCOSE SERPL-MCNC: 88 MG/DL — SIGNIFICANT CHANGE UP (ref 70–99)
GLUCOSE UR QL: NEGATIVE MG/DL — SIGNIFICANT CHANGE UP
HCT VFR BLD CALC: 28.3 % — LOW (ref 37–47)
HGB BLD-MCNC: 9.5 G/DL — LOW (ref 12–16)
IMM GRANULOCYTES NFR BLD AUTO: 0.8 % — HIGH (ref 0.1–0.3)
KETONES UR-MCNC: NEGATIVE MG/DL — SIGNIFICANT CHANGE UP
LACTATE SERPL-SCNC: 0.7 MMOL/L — SIGNIFICANT CHANGE UP (ref 0.7–2)
LEUKOCYTE ESTERASE UR-ACNC: NEGATIVE — SIGNIFICANT CHANGE UP
LYMPHOCYTES # BLD AUTO: 0.67 K/UL — LOW (ref 1.2–3.4)
LYMPHOCYTES # BLD AUTO: 18.9 % — LOW (ref 20.5–51.1)
MCHC RBC-ENTMCNC: 31.4 PG — HIGH (ref 27–31)
MCHC RBC-ENTMCNC: 33.6 G/DL — SIGNIFICANT CHANGE UP (ref 32–37)
MCV RBC AUTO: 93.4 FL — SIGNIFICANT CHANGE UP (ref 81–99)
MONOCYTES # BLD AUTO: 0.34 K/UL — SIGNIFICANT CHANGE UP (ref 0.1–0.6)
MONOCYTES NFR BLD AUTO: 9.6 % — HIGH (ref 1.7–9.3)
NEUTROPHILS # BLD AUTO: 2.38 K/UL — SIGNIFICANT CHANGE UP (ref 1.4–6.5)
NEUTROPHILS NFR BLD AUTO: 67.3 % — SIGNIFICANT CHANGE UP (ref 42.2–75.2)
NITRITE UR-MCNC: NEGATIVE — SIGNIFICANT CHANGE UP
NRBC BLD AUTO-RTO: 0 /100 WBCS — SIGNIFICANT CHANGE UP (ref 0–0)
OSMOLALITY SERPL: 246 MOS/KG — LOW (ref 280–301)
OSMOLALITY UR: 336 MOS/KG — SIGNIFICANT CHANGE UP (ref 50–1200)
PH UR: 6 — SIGNIFICANT CHANGE UP (ref 5–8)
PLATELET # BLD AUTO: 187 K/UL — SIGNIFICANT CHANGE UP (ref 130–400)
PMV BLD: 10.6 FL — HIGH (ref 7.4–10.4)
POTASSIUM SERPL-MCNC: 4.2 MMOL/L — SIGNIFICANT CHANGE UP (ref 3.5–5)
POTASSIUM SERPL-MCNC: 4.3 MMOL/L — SIGNIFICANT CHANGE UP (ref 3.5–5)
POTASSIUM SERPL-SCNC: 4.2 MMOL/L — SIGNIFICANT CHANGE UP (ref 3.5–5)
POTASSIUM SERPL-SCNC: 4.3 MMOL/L — SIGNIFICANT CHANGE UP (ref 3.5–5)
POTASSIUM UR-SCNC: 20 MMOL/L — SIGNIFICANT CHANGE UP
PROT ?TM UR-MCNC: 8 MG/DL — SIGNIFICANT CHANGE UP
PROT SERPL-MCNC: 6.2 G/DL — SIGNIFICANT CHANGE UP (ref 6–8)
PROT UR-MCNC: NEGATIVE MG/DL — SIGNIFICANT CHANGE UP
PROT/CREAT UR-RTO: 0.2 RATIO — SIGNIFICANT CHANGE UP (ref 0–0.2)
RBC # BLD: 3.03 M/UL — LOW (ref 4.2–5.4)
RBC # FLD: 15.4 % — HIGH (ref 11.5–14.5)
SODIUM SERPL-SCNC: 116 MMOL/L — CRITICAL LOW (ref 135–146)
SODIUM SERPL-SCNC: 120 MMOL/L — LOW (ref 135–146)
SODIUM UR-SCNC: 68 MMOL/L — SIGNIFICANT CHANGE UP
SP GR SPEC: 1.01 — SIGNIFICANT CHANGE UP (ref 1–1.03)
UROBILINOGEN FLD QL: 0.2 MG/DL — SIGNIFICANT CHANGE UP (ref 0.2–1)
WBC # BLD: 3.54 K/UL — LOW (ref 4.8–10.8)
WBC # FLD AUTO: 3.54 K/UL — LOW (ref 4.8–10.8)

## 2025-05-01 PROCEDURE — 84540 ASSAY OF URINE/UREA-N: CPT

## 2025-05-01 PROCEDURE — 83935 ASSAY OF URINE OSMOLALITY: CPT

## 2025-05-01 PROCEDURE — 82728 ASSAY OF FERRITIN: CPT

## 2025-05-01 PROCEDURE — 80053 COMPREHEN METABOLIC PANEL: CPT

## 2025-05-01 PROCEDURE — 83550 IRON BINDING TEST: CPT

## 2025-05-01 PROCEDURE — 83735 ASSAY OF MAGNESIUM: CPT

## 2025-05-01 PROCEDURE — 84156 ASSAY OF PROTEIN URINE: CPT

## 2025-05-01 PROCEDURE — 85027 COMPLETE CBC AUTOMATED: CPT

## 2025-05-01 PROCEDURE — 87641 MR-STAPH DNA AMP PROBE: CPT

## 2025-05-01 PROCEDURE — 36415 COLL VENOUS BLD VENIPUNCTURE: CPT

## 2025-05-01 PROCEDURE — 87640 STAPH A DNA AMP PROBE: CPT

## 2025-05-01 PROCEDURE — 85025 COMPLETE CBC W/AUTO DIFF WBC: CPT

## 2025-05-01 PROCEDURE — 80162 ASSAY OF DIGOXIN TOTAL: CPT

## 2025-05-01 PROCEDURE — 84550 ASSAY OF BLOOD/URIC ACID: CPT

## 2025-05-01 PROCEDURE — 93010 ELECTROCARDIOGRAM REPORT: CPT

## 2025-05-01 PROCEDURE — 97161 PT EVAL LOW COMPLEX 20 MIN: CPT | Mod: GP

## 2025-05-01 PROCEDURE — 70450 CT HEAD/BRAIN W/O DYE: CPT | Mod: MC

## 2025-05-01 PROCEDURE — 99285 EMERGENCY DEPT VISIT HI MDM: CPT

## 2025-05-01 PROCEDURE — 80048 BASIC METABOLIC PNL TOTAL CA: CPT

## 2025-05-01 PROCEDURE — 71045 X-RAY EXAM CHEST 1 VIEW: CPT

## 2025-05-01 PROCEDURE — 83930 ASSAY OF BLOOD OSMOLALITY: CPT

## 2025-05-01 PROCEDURE — 99223 1ST HOSP IP/OBS HIGH 75: CPT

## 2025-05-01 PROCEDURE — 82533 TOTAL CORTISOL: CPT

## 2025-05-01 PROCEDURE — 94640 AIRWAY INHALATION TREATMENT: CPT

## 2025-05-01 PROCEDURE — 71046 X-RAY EXAM CHEST 2 VIEWS: CPT | Mod: 26

## 2025-05-01 PROCEDURE — 83540 ASSAY OF IRON: CPT

## 2025-05-01 PROCEDURE — 84443 ASSAY THYROID STIM HORMONE: CPT

## 2025-05-01 PROCEDURE — 84300 ASSAY OF URINE SODIUM: CPT

## 2025-05-01 PROCEDURE — 84145 PROCALCITONIN (PCT): CPT

## 2025-05-01 PROCEDURE — 82570 ASSAY OF URINE CREATININE: CPT

## 2025-05-01 PROCEDURE — 84133 ASSAY OF URINE POTASSIUM: CPT

## 2025-05-01 RX ORDER — TADALAFIL 20 MG/1
2 TABLET, FILM COATED ORAL
Refills: 0 | DISCHARGE

## 2025-05-01 RX ORDER — ALPRAZOLAM 0.5 MG
1 TABLET, EXTENDED RELEASE 24 HR ORAL
Refills: 0 | DISCHARGE

## 2025-05-01 RX ORDER — SODIUM CHLORIDE 9 G/1000ML
1000 INJECTION, SOLUTION INTRAVENOUS ONCE
Refills: 0 | Status: COMPLETED | OUTPATIENT
Start: 2025-05-01 | End: 2025-05-01

## 2025-05-01 RX ORDER — CEFTRIAXONE 500 MG/1
2000 INJECTION, POWDER, FOR SOLUTION INTRAMUSCULAR; INTRAVENOUS ONCE
Refills: 0 | Status: COMPLETED | OUTPATIENT
Start: 2025-05-01 | End: 2025-05-01

## 2025-05-01 RX ORDER — ONDANSETRON HCL/PF 4 MG/2 ML
4 VIAL (ML) INJECTION ONCE
Refills: 0 | Status: COMPLETED | OUTPATIENT
Start: 2025-05-01 | End: 2025-05-01

## 2025-05-01 RX ORDER — ACETAMINOPHEN 500 MG/5ML
650 LIQUID (ML) ORAL ONCE
Refills: 0 | Status: COMPLETED | OUTPATIENT
Start: 2025-05-01 | End: 2025-05-01

## 2025-05-01 RX ORDER — SODIUM CHLORIDE 3 G/100ML
500 INJECTION, SOLUTION INTRAVENOUS
Refills: 0 | Status: DISCONTINUED | OUTPATIENT
Start: 2025-05-01 | End: 2025-05-02

## 2025-05-01 RX ORDER — SODIUM CHLORIDE 3 G/100ML
500 INJECTION, SOLUTION INTRAVENOUS
Refills: 0 | Status: DISCONTINUED | OUTPATIENT
Start: 2025-05-01 | End: 2025-05-01

## 2025-05-01 RX ORDER — ACETAMINOPHEN 500 MG/5ML
650 LIQUID (ML) ORAL EVERY 6 HOURS
Refills: 0 | Status: DISCONTINUED | OUTPATIENT
Start: 2025-05-01 | End: 2025-05-05

## 2025-05-01 RX ORDER — DIGOXIN 250 UG/1
1 TABLET ORAL
Refills: 0 | DISCHARGE

## 2025-05-01 RX ORDER — HYDROXYCHLOROQUINE SULFATE 200 MG/1
400 TABLET, FILM COATED ORAL DAILY
Refills: 0 | Status: DISCONTINUED | OUTPATIENT
Start: 2025-05-01 | End: 2025-05-02

## 2025-05-01 RX ORDER — AMBRISENTAN 5 MG/1
10 TABLET, FILM COATED ORAL DAILY
Refills: 0 | Status: DISCONTINUED | OUTPATIENT
Start: 2025-05-01 | End: 2025-05-05

## 2025-05-01 RX ORDER — AMBRISENTAN 5 MG/1
1 TABLET, FILM COATED ORAL
Refills: 0 | DISCHARGE

## 2025-05-01 RX ORDER — TIOTROPIUM BROMIDE INHALATION SPRAY 3.12 UG/1
2 SPRAY, METERED RESPIRATORY (INHALATION) DAILY
Refills: 0 | Status: DISCONTINUED | OUTPATIENT
Start: 2025-05-01 | End: 2025-05-05

## 2025-05-01 RX ORDER — FERROUS SULFATE 137(45) MG
3 TABLET, EXTENDED RELEASE ORAL
Refills: 0 | DISCHARGE

## 2025-05-01 RX ORDER — ALPRAZOLAM 0.5 MG
0.25 TABLET, EXTENDED RELEASE 24 HR ORAL
Refills: 0 | Status: DISCONTINUED | OUTPATIENT
Start: 2025-05-01 | End: 2025-05-05

## 2025-05-01 RX ORDER — FLUTICASONE FUROATE, UMECLIDINIUM BROMIDE AND VILANTEROL TRIFENATATE 100; 62.5; 25 UG/1; UG/1; UG/1
1 POWDER RESPIRATORY (INHALATION)
Refills: 0 | DISCHARGE

## 2025-05-01 RX ADMIN — SODIUM CHLORIDE 1000 MILLILITER(S): 9 INJECTION, SOLUTION INTRAVENOUS at 19:27

## 2025-05-01 RX ADMIN — CEFTRIAXONE 100 MILLIGRAM(S): 500 INJECTION, POWDER, FOR SOLUTION INTRAMUSCULAR; INTRAVENOUS at 21:14

## 2025-05-01 RX ADMIN — SODIUM CHLORIDE 1000 MILLILITER(S): 9 INJECTION, SOLUTION INTRAVENOUS at 20:16

## 2025-05-01 NOTE — ED PROVIDER NOTE - ATTENDING CONTRIBUTION TO CARE
75F PMH CHF pulm HTN COPD RA scleroderma p/w 10 days dysuria, freq, gen weaknenss. has tried 3 different abx with no relief. still feels like has uti. also c/o dry cough x 3 days. no cp, sob. no abd pain d/c. no flank pain. had 1 episode post tussive emesis.     on exam, AFVSS, well mayo nad, ncat, eomi, perrla, mmm, lctab, rrr nl s1s2 no mrg, abd soft ntnd, no cvat, aaox3, no focal deficits, no le edema or calf ttp,     a/p; cough, uti sx, failed po abx, gen weakness, will do labs, ut ct cxr ivf  antiemetics re-eval

## 2025-05-01 NOTE — ED PROVIDER NOTE - PHYSICAL EXAMINATION
Constitutional: Well developed, well nourished, no acute distress  Head: Normocephalic, Atraumatic  Eyes: PERRLA, EOMI, conjunctiva and sclera WNL  ENT: Moist mucous membranes, no rhinorrhea,    Neck: Supple, Nontender,   Respiratory: Normal chest excursion with respiration; Breath sounds clear and equal B/L; No wheezes, rales, or rhonchi   Cardiovascular: RRR; Normal S1, S2; No murmurs, rubs or gallops   ABD/GI:  Nondistended; Nontender; No guarding, rigidity or rebound , No CVA tenderness  EXT/MS: Moving all extremities; Distal pulses 2+ B/L   Skin: Normal for age and race; Warm and dry; Petechial blanching rash to face  Neurologic: AAO x 4; ; Normal motor and sensory function  Psychiatric: Appropriate affect, normal mood

## 2025-05-01 NOTE — H&P ADULT - ASSESSMENT
75-year-old female with past medical history of pulm HTN, scleroderma, rheumatoid arthritis, presenting with dysuria, nausea and vomiting.  Patient reports urinary symptoms for the past few weeks. She states she has been on 3 different courses of abx, was on Bactrim about 8 days ago then started on Keflex for last 4 days, still with some dysuria. Patients states that yesterday she started feeling very dizzy and lightheaded, when she went to get up from her bed she felt like the floor was swaying beneath her and she had to hold on to something to walk to the bathroom. She called her PCP who told her to stop taking the antibiotic. She notes that today she woke up with nausea, 7/10 headache, lightheadedness, and blurry vision. She also notes dry cough that started around 3 days ago. She states she has had normal appeitite and has been eating and drinking normally, reports she acutally has been drinking a little more water than usual because of her UTI (about 2-3 12oz water bottles a day). Patient reports episode of nausea and emesis in the ED, unable to hold down food and water today. She lives alone, independent in ADLs, occasionally uses cane. Otherwise denies fever, chills, chest pain, shortness of breath, abdominal pain, diarrhea, constipation, recent falls or trauma.     # severe symptomatic hyponatremia - unclear baseline - hypovolemic vs medication induced vs infectious   - patient reporting dizziness, lightheaded, feeling like room is swaying, N/V, 7/10 headache, and blurry vision x2 days  - also notes dry cough that started 3 days ago    - CXR without focal consolidation   - Na 116 on admission, unclear baseline; was 129 in Nov 2024, prior to that was 140s (Sept/Oct 2024)   - repeat Na 120 s/p 1L LR   - spoke to nephrology, can do hypertonic saline; will do 25cc/hr x2 hrs and reassess given patient correction to 116 -> 120   - hold home spironolactone 50mg daily and furosemide 80mg daily   - EKG NSR, no NH or QT changes  - f/u formal nephro eval   - check urine studies   - f/u CTH   - f/u flu, urine legionella   - check digoxin level  - BMP q4h; goal Na correction 6-8meQ in 24h   - fall and seizure precautions     # dysuria - failed outpt abx?   - patient states she has had 3 different courses of abx, she doesnt remember exactly what, was on Bactrim x 8days, currently on Keflex, still reporting dysuria   - outpt UCx from 9/12/23 with pansensitive E. Coli   - Ucx from 12/2024 with E. Coli, resistant to cipro. Bactrim, and levaquin; sens to CTX  - UA neg in ED but patient has been on multiple abx   - s/p CTX x1 in ED   - will continue on CTX for now, pending procal/UCx  - if infectious etiology ruled out, can trial pyridium     # pulm HTN - CTD related   - co-managed with Dr. Farah and Dr. Alicia   - Echo 12/2024: EF 60%, mild MR, mild TR, PASP 44   - cont home ambrisentan 10mg daily   - cont home tadalafil 40mg daily (non-formulary)   - hold home Lasix 80mg daily and spironolactone 50mg daily     # hx slceroderma   # hx RA   - cont home hydroxychlorquine 400mg daily     # digoxin use?   - patient is on digoxin 0.125mcg daily - unclear why    # Misc   - DVT ppx: Lovenox   - GI ppx: N/A  - Diet: Regular  - Activity: IAT  - Dispo: SDU  - Pending: Na correction, CTH, infectious w/u 75-year-old female with past medical history of pulm HTN, scleroderma, rheumatoid arthritis, presenting with dysuria, nausea and vomiting.  Patient reports urinary symptoms for the past few weeks. She states she has been on 3 different courses of abx, was on Bactrim about 8 days ago then started on Keflex for last 4 days, still with some dysuria. Patients states that yesterday she started feeling very dizzy and lightheaded, when she went to get up from her bed she felt like the floor was swaying beneath her and she had to hold on to something to walk to the bathroom. She called her PCP who told her to stop taking the antibiotic. She notes that today she woke up with nausea, 7/10 headache, lightheadedness, and blurry vision. She also notes dry cough that started around 3 days ago. She states she has had normal appeitite and has been eating and drinking normally, reports she acutally has been drinking a little more water than usual because of her UTI (about 2-3 12oz water bottles a day). Patient reports episode of nausea and emesis in the ED, unable to hold down food and water today. She lives alone, independent in ADLs, occasionally uses cane. Otherwise denies fever, chills, chest pain, shortness of breath, abdominal pain, diarrhea, constipation, recent falls or trauma.     # severe symptomatic hyponatremia - unclear baseline - hypovolemic vs medication induced vs infectious   - patient reporting dizziness, lightheaded, feeling like room is swaying, N/V, 7/10 headache, and blurry vision x2 days  - also notes dry cough that started 3 days ago    - CXR without focal consolidation   - Na 116 on admission, unclear baseline; was 129 in Nov 2024, prior to that was 140s (Sept/Oct 2024)   - repeat Na 120 s/p 1L LR   - spoke to nephrology, can do hypertonic saline; will do 25cc/hr x2 hrs and reassess given patient correction to 116 -> 120   - hold home spironolactone 50mg daily and furosemide 80mg daily   - EKG NSR, no NH or QT changes  - f/u formal nephro eval   - check urine studies, serum Osm   - f/u CTH   - f/u flu, urine legionella   - check digoxin level  - BMP q4h; goal Na correction 6-8meQ in 24h   - fall and seizure precautions     # dysuria - failed outpt abx?   - patient states she has had 3 different courses of abx, she doesnt remember exactly what, was on Bactrim x 8days, currently on Keflex, still reporting dysuria   - outpt UCx from 9/12/23 with pansensitive E. Coli   - Ucx from 12/2024 with E. Coli, resistant to cipro. Bactrim, and levaquin; sens to CTX  - UA neg in ED but patient has been on multiple abx   - s/p CTX x1 in ED   - will continue on CTX for now, pending procal/UCx  - if infectious etiology ruled out, can trial pyridium     # pulm HTN - CTD related   - co-managed with Dr. Farah and Dr. Alicia   - Echo 12/2024: EF 60%, mild MR, mild TR, PASP 44   - cont home ambrisentan 10mg daily   - cont home tadalafil 40mg daily (non-formulary)   - hold home Lasix 80mg daily and spironolactone 50mg daily     # hx slceroderma   # hx RA   - cont home hydroxychlorquine 400mg daily     # digoxin use?   - patient is on digoxin 0.125mcg daily - unclear why    # Misc   - DVT ppx: Lovenox   - GI ppx: N/A  - Diet: Regular  - Activity: IAT  - Dispo: SDU  - Pending: Na correction, CTH, infectious w/u 75-year-old female with past medical history of pulm HTN, scleroderma, rheumatoid arthritis, presenting with dysuria, nausea and vomiting.  Patient reports urinary symptoms for the past few weeks. She states she has been on 3 different courses of abx, was on Bactrim about 8 days ago then started on Keflex for last 4 days, still with some dysuria. Patients states that yesterday she started feeling very dizzy and lightheaded, when she went to get up from her bed she felt like the floor was swaying beneath her and she had to hold on to something to walk to the bathroom. She called her PCP who told her to stop taking the antibiotic. She notes that today she woke up with nausea, 7/10 headache, lightheadedness, and blurry vision. She also notes dry cough that started around 3 days ago. She states she has had normal appeitite and has been eating and drinking normally, reports she acutally has been drinking a little more water than usual because of her UTI (about 2-3 12oz water bottles a day). Patient reports episode of nausea and emesis in the ED, unable to hold down food and water today. She lives alone, independent in ADLs, occasionally uses cane. Otherwise denies fever, chills, chest pain, shortness of breath, abdominal pain, diarrhea, constipation, recent falls or trauma.     # severe symptomatic hyponatremia - unclear baseline - hypovolemic vs medication induced vs infectious   - patient reporting dizziness, lightheaded, feeling like room is swaying, N/V, 7/10 headache, and blurry vision x2 days  - also notes dry cough that started 3 days ago    - CXR without focal consolidation   - Na 116 on admission, unclear baseline; was 129 in Nov 2024, prior to that was 140s (Sept/Oct 2024)   - repeat Na 120 s/p 1L LR   - spoke to nephrology, can do hypertonic saline; will do 25cc/hr x2 hrs and reassess given patient correction to 116 -> 120   - hold home spironolactone 50mg daily and furosemide 80mg daily   - EKG NSR, no [place] or QT changes  - f/u formal nephro eval   - check urine studies, serum Osm   - f/u CTH   - f/u flu, urine legionella   - check digoxin level  - BMP q4h; goal Na correction 6-8meQ in 24h   - fall and seizure precautions     # dysuria - failed outpt abx?   - patient states she has had 3 different courses of abx, she doesnt remember exactly what, was on Bactrim x 8days, currently on Keflex, still reporting dysuria   - outpt UCx from 9/12/23 with pansensitive E. Coli   - Ucx from 12/2024 with E. Coli, resistant to cipro. Bactrim, and levaquin; sens to CTX  - UA neg in ED but patient has been on multiple abx   - s/p CTX x1 in ED   - will continue on CTX for now, pending procal/UCx/BCx  - if infectious etiology ruled out, can trial pyridium     # pulm HTN - CTD related   - co-managed with Dr. Farah and Dr. Alicia   - Echo 12/2024: EF 60%, mild MR, mild TR, PASP 44   - patient uses 3L NC at night   - cont home ambrisentan 10mg daily   - cont home tadalafil 40mg daily (non-formulary)   - hold home Lasix 80mg daily and spironolactone 50mg daily     # hx slceroderma   # hx RA   - cont home hydroxychlorquine 400mg daily     # digoxin use?   - patient is on digoxin 0.125mcg daily - unclear why    # Misc   - DVT ppx: Lovenox   - GI ppx: N/A  - Diet: Regular  - Activity: IAT  - Dispo: SDU  - Pending: Na correction, CTH, infectious w/u 75-year-old female with past medical history of pulm HTN, scleroderma, rheumatoid arthritis, presenting with dysuria, nausea and vomiting.  Patient reports urinary symptoms for the past few weeks. She states she has been on 3 different courses of abx, was on Bactrim about 8 days ago then started on Keflex for last 4 days, still with some dysuria. Patients states that yesterday she started feeling very dizzy and lightheaded, when she went to get up from her bed she felt like the floor was swaying beneath her and she had to hold on to something to walk to the bathroom. She called her PCP who told her to stop taking the antibiotic. She notes that today she woke up with nausea, 7/10 headache, lightheadedness, and blurry vision. She also notes dry cough that started around 3 days ago. She states she has had normal appeitite and has been eating and drinking normally, reports she acutally has been drinking a little more water than usual because of her UTI (about 2-3 12oz water bottles a day). Patient reports episode of nausea and emesis in the ED, unable to hold down food and water today. She lives alone, independent in ADLs, occasionally uses cane. Otherwise denies fever, chills, chest pain, shortness of breath, abdominal pain, diarrhea, constipation, recent falls or trauma.     # severe symptomatic hyponatremia - unclear baseline - hypovolemic vs medication induced vs infectious   - patient reporting dizziness, lightheaded, feeling like room is swaying, N/V, 7/10 headache, and blurry vision x2 days  - also notes dry cough that started 3 days ago    - CXR without focal consolidation   - Na 116 on admission, unclear baseline; was 129 in Nov 2024, prior to that was 140s (Sept/Oct 2024)   - repeat Na 120 s/p 1L LR   - spoke to nephrology, can do hypertonic saline; will do 25cc/hr x2 hrs and reassess given patient correction to 116 -> 120   - hold home spironolactone 50mg daily and furosemide 80mg daily   - EKG NSR, no CO or QT changes  - f/u formal nephro eval   - check urine studies, serum Osm   - f/u CTH   - f/u flu, urine legionella   - check digoxin level  - BMP q4h; goal Na correction 6-8meQ in 24h   - fall and seizure precautions     # dysuria - failed outpt abx?   - patient states she has had 3 different courses of abx, she doesnt remember exactly what, was on Bactrim x 8days, currently on Keflex, still reporting dysuria   - outpt UCx from 9/12/23 with pansensitive E. Coli   - Ucx from 12/2024 with E. Coli, resistant to cipro. Bactrim, and levaquin; sens to CTX  - UA neg in ED but patient has been on multiple abx   - s/p CTX x1 in ED   - will continue on CTX for now, pending procal/UCx/BCx  - if infectious etiology ruled out, can trial pyridium     # pulm HTN - CTD related   - co-managed with Dr. Farah and Dr. Alicia   - Echo 12/2024: EF 60%, mild MR, mild TR, PASP 44   - patient uses 3L NC at night   - cont home ambrisentan 10mg daily   - cont home tadalafil 40mg daily (non-formulary)   - hold home Lasix 80mg daily and spironolactone 50mg daily     # hx slceroderma   # hx RA   - cont home hydroxychlorquine 400mg daily     # digoxin use?   - patient is on digoxin 0.125mcg daily - unclear why, states she was started by Dr. Alciia     # Misc   - DVT ppx: Lovenox   - GI ppx: N/A  - Diet: Regular  - Activity: IAT  - Dispo: SDU  - Pending: Na correction, CTH, infectious w/u 75-year-old female with past medical history of pulm HTN, scleroderma, rheumatoid arthritis, presenting with dysuria, nausea and vomiting.  Patient reports urinary symptoms for the past few weeks. She states she has been on 3 different courses of abx, was on Bactrim about 8 days ago then started on Keflex for last 4 days, still with some dysuria. Patients states that yesterday she started feeling very dizzy and lightheaded, when she went to get up from her bed she felt like the floor was swaying beneath her and she had to hold on to something to walk to the bathroom. She called her PCP who told her to stop taking the antibiotic. She notes that today she woke up with nausea, 7/10 headache, lightheadedness, and blurry vision. She also notes dry cough that started around 3 days ago. She states she has had normal appeitite and has been eating and drinking normally, reports she acutally has been drinking a little more water than usual because of her UTI (about 2-3 12oz water bottles a day). Patient reports episode of nausea and emesis in the ED, unable to hold down food and water today. She lives alone, independent in ADLs, occasionally uses cane. Otherwise denies fever, chills, chest pain, shortness of breath, abdominal pain, diarrhea, constipation, recent falls or trauma.     # severe symptomatic hyponatremia - unclear baseline - hypovolemic vs medication induced vs infectious   - patient reporting dizziness, lightheaded, feeling like room is swaying, N/V, 7/10 headache, and blurry vision x2 days  - also notes dry cough that started 3 days ago    - CXR without focal consolidation   - Na 116 on admission, unclear baseline; was 129 in Nov 2024, prior to that was 140s (Sept/Oct 2024)   - repeat Na 120 s/p 1L LR   - spoke to nephrology, can do hypertonic saline; will do 25cc/hr x2 hrs and reassess given patient correction to 116 -> 120   - hold home spironolactone 50mg daily and furosemide 20mg daily   - EKG NSR, no [place] or QT changes  - f/u formal nephro eval   - check urine studies, serum Osm   - f/u CTH   - f/u flu, urine legionella   - check digoxin level  - BMP q4h; goal Na correction 6-8meQ in 24h   - fall and seizure precautions     # dysuria - failed outpt abx?   - patient states she has had 3 different courses of abx, she doesnt remember exactly what, was on Bactrim x 8days, currently on Keflex, still reporting dysuria   - outpt UCx from 9/12/23 with pansensitive E. Coli   - Ucx from 12/2024 with E. Coli, resistant to cipro. Bactrim, and levaquin; sens to CTX  - UA neg in ED but patient has been on multiple abx   - s/p CTX x1 in ED   - will continue on CTX for now, pending procal/UCx/BCx  - if infectious etiology ruled out, can trial pyridium     # pulm HTN - CTD related   - co-managed with Dr. Farah and Dr. Alicia   - Echo 12/2024: EF 60%, mild MR, mild TR, PASP 44   - patient uses 3L NC at night   - cont home ambrisentan 10mg daily   - cont home tadalafil 40mg daily (non-formulary)   - hold home Lasix 20mg daily and spironolactone 50mg daily     # hx slceroderma   # hx RA   - cont home hydroxychlorquine 200mg daily     # digoxin use - afib?    - patient is on digoxin 0.125mcg daily - unclear why, states she was started by Dr. Alicia     # Misc   - DVT ppx: Lovenox   - GI ppx: N/A  - Diet: Regular  - Activity: IAT  - Dispo: SDU  - Pending: Na correction, CTH, infectious w/u 75-year-old female with past medical history of pulm HTN, scleroderma, rheumatoid arthritis, presenting with dysuria, nausea and vomiting.  Patient reports urinary symptoms for the past few weeks. She states she has been on 3 different courses of abx, was on Bactrim about 8 days ago then started on Keflex for last 4 days, still with some dysuria. Patients states that yesterday she started feeling very dizzy and lightheaded, when she went to get up from her bed she felt like the floor was swaying beneath her and she had to hold on to something to walk to the bathroom. She called her PCP who told her to stop taking the antibiotic. She notes that today she woke up with nausea, 7/10 headache, lightheadedness, and blurry vision. She also notes dry cough that started around 3 days ago. She states she has had normal appeitite and has been eating and drinking normally, reports she acutally has been drinking a little more water than usual because of her UTI (about 2-3 12oz water bottles a day). Patient reports episode of nausea and emesis in the ED, unable to hold down food and water today. She lives alone, independent in ADLs, occasionally uses cane. Otherwise denies fever, chills, chest pain, shortness of breath, abdominal pain, diarrhea, constipation, recent falls or trauma.     # severe symptomatic hyponatremia - unclear baseline - hypovolemic vs medication induced vs infectious   - patient reporting dizziness, lightheaded, feeling like room is swaying, N/V, 7/10 headache, and blurry vision x2 days  - also notes dry cough that started 3 days ago    - CXR without focal consolidation   - Na 116 on admission, unclear baseline; was 129 in Nov 2024, prior to that was 140s (Sept/Oct 2024)   - repeat Na 120 s/p 1L LR   - spoke to nephrology, can do hypertonic saline; will do 25cc/hr x2 hrs and reassess given patient correction to 116 -> 120   - hold home spironolactone 50mg daily and furosemide 20mg daily   - EKG NSR, no CO or QT changes  - f/u formal nephro eval   - check urine studies, serum Osm   - f/u CTH   - f/u flu, urine legionella   - check digoxin level  - BMP q4h; goal Na correction 6-8meQ in 24h   - fall and seizure precautions     # dysuria - failed outpt abx?   - patient states she has had 3 different courses of abx, she doesnt remember exactly what, was on Bactrim x 8days, currently on Keflex, still reporting dysuria   - outpt UCx from 9/12/23 with pansensitive E. Coli   - Ucx from 12/2024 with E. Coli, resistant to cipro. Bactrim, and levaquin; sens to CTX  - UA neg in ED but patient has been on multiple abx   - s/p CTX x1 in ED   - will continue on CTX for now, pending procal/UCx/BCx  - if infectious etiology ruled out, can trial pyridium     # pulm HTN - CTD related   - co-managed with Dr. Farah and Dr. Alicia   - Echo 12/2024: EF 60%, mild MR, mild TR, PASP 44   - patient uses 3L NC at night   - cont home ambrisentan 10mg daily (have to confirm with pharm per pharm here - pt uses [facility] on [address] [facility])   - cont home tadalafil 40mg daily (non-formulary - need to confirm with pharm per pharm here)   - hold home Lasix 20mg daily and spironolactone 50mg daily     # hx slceroderma   # hx RA   - cont home hydroxychlorquine 200mg daily     # digoxin use - afib?    - patient is on digoxin 0.125mcg daily - unclear why, states she was started by Dr. Alicia     # Misc   - DVT ppx: Lovenox   - GI ppx: N/A  - Diet: Regular  - Activity: IAT  - Dispo: SDU  - Pending: Na correction, CTH, infectious w/u 75-year-old female with past medical history of pulm HTN, scleroderma, rheumatoid arthritis, presenting with dysuria, nausea and vomiting.  Patient reports urinary symptoms for the past few weeks. She states she has been on 3 different courses of abx, was on Bactrim about 8 days ago then started on Keflex for last 4 days, still with some dysuria. Patients states that yesterday she started feeling very dizzy and lightheaded, when she went to get up from her bed she felt like the floor was swaying beneath her and she had to hold on to something to walk to the bathroom. She called her PCP who told her to stop taking the antibiotic. She notes that today she woke up with nausea, 7/10 headache, lightheadedness, and blurry vision. She also notes dry cough that started around 3 days ago. She states she has had normal appeitite and has been eating and drinking normally, reports she acutally has been drinking a little more water than usual because of her UTI (about 2-3 12oz water bottles a day). Patient reports episode of nausea and emesis in the ED, unable to hold down food and water today. She lives alone, independent in ADLs, occasionally uses cane. Otherwise denies fever, chills, chest pain, shortness of breath, abdominal pain, diarrhea, constipation, recent falls or trauma.     # severe symptomatic hyponatremia - unclear baseline - hypovolemic vs medication induced vs infectious   - patient reporting dizziness, lightheaded, feeling like room is swaying, N/V, 7/10 headache, and blurry vision x2 days  - also notes dry cough that started 3 days ago    - CXR without focal consolidation   - Na 116 on admission, unclear baseline; was 129 in Nov 2024, prior to that was 140s (Sept/Oct 2024)   - repeat Na 120 s/p 1L LR   - spoke to nephrology, can do hypertonic saline; will do 25cc/hr x2 hrs and reassess given patient correction to 116 -> 120 -> 117 after 2 hours hypertonic? ; will continue hypertonic for additional 2 hours for total 100cc  - hold home spironolactone 50mg daily and furosemide 20mg daily   - EKG NSR, no [place] or QT changes  - f/u formal nephro eval   - check urine studies, serum Osm   - f/u CTH   - f/u flu, urine legionella   - check digoxin level  - BMP q4h; goal Na correction 6-8meQ in 24h   - fall and seizure precautions     # dysuria - failed outpt abx?   - patient states she has had 3 different courses of abx, she doesnt remember exactly what, was on Bactrim x 8days, currently on Keflex, still reporting dysuria   - outpt UCx from 9/12/23 with pansensitive E. Coli   - Ucx from 12/2024 with E. Coli, resistant to cipro. Bactrim, and levaquin; sens to CTX  - UA neg in ED but patient has been on multiple abx   - s/p CTX x1 in ED   - will continue on CTX for now, pending procal/UCx/BCx  - if infectious etiology ruled out, can trial pyridium     # pulm HTN - CTD related   - co-managed with Dr. Farah and Dr. Alicia   - Echo 12/2024: EF 60%, mild MR, mild TR, PASP 44   - patient uses 3L NC at night   - cont home ambrisentan 10mg daily (have to confirm with pharm per pharm here - pt uses [facility] on [address])   - cont home tadalafil 40mg daily (non-formulary - need to confirm with pharm per pharm here)   - hold home Lasix 20mg daily and spironolactone 50mg daily     # hx slceroderma   # hx RA   - cont home hydroxychlorquine 200mg daily     # digoxin use - afib?    - patient is on digoxin 0.125mcg daily - unclear why, states she was started by Dr. Alicia     # Misc   - DVT ppx: Lovenox   - GI ppx: N/A  - Diet: Regular  - Activity: IAT  - Dispo: SDU  - Pending: Na correction, CTH, infectious w/u 75-year-old female with past medical history of pulm HTN, scleroderma, rheumatoid arthritis, presenting with dysuria, nausea and vomiting.  Patient reports urinary symptoms for the past few weeks. She states she has been on 3 different courses of abx, was on Bactrim about 8 days ago then started on Keflex for last 4 days, still with some dysuria. Patients states that yesterday she started feeling very dizzy and lightheaded, when she went to get up from her bed she felt like the floor was swaying beneath her and she had to hold on to something to walk to the bathroom. She called her PCP who told her to stop taking the antibiotic. She notes that today she woke up with nausea, 7/10 headache, lightheadedness, and blurry vision. She also notes dry cough that started around 3 days ago. She states she has had normal appeitite and has been eating and drinking normally, reports she acutally has been drinking a little more water than usual because of her UTI (about 2-3 12oz water bottles a day). Patient reports episode of nausea and emesis in the ED, unable to hold down food and water today. She lives alone, independent in ADLs, occasionally uses cane. Otherwise denies fever, chills, chest pain, shortness of breath, abdominal pain, diarrhea, constipation, recent falls or trauma.     # severe symptomatic hyponatremia - unclear baseline - medication induced vs hypovolemic vs r/o infectious   - patient reporting dizziness, lightheaded, feeling like room is swaying, N/V, 7/10 headache, and blurry vision x2 days  - also notes dry cough that started 3 days ago    - CXR without focal consolidation   - Na 116 on admission, unclear baseline; was 129 in Nov 2024, prior to that was 140s (Sept/Oct 2024)   - spoke to nephrology, can do hypertonic saline; will do 25cc/hr x4 hrs  - Na 116 -> 120 after 1L LR -> 117 after 2 hours hypertonic   - serum osm is low, urine sodium is >20 (but patient is on diuretics), urine osm is 300 - bactrim induced SIADH?  - hold home spironolactone 50mg daily and furosemide 20mg daily   - EKG NSR, no LA or QT changes  - f/u formal nephro eval   - check urine studies, serum Osm   - f/u CTH   - f/u flu, urine legionella   - check digoxin level  - BMP q4h; goal Na correction 6-8meQ in 24h   - fall and seizure precautions     # dysuria - failed outpt abx?   - patient states she has had 3 different courses of abx, she doesnt remember exactly what, was on Bactrim x 8days, currently on Keflex, still reporting dysuria   - outpt UCx from 9/12/23 with pansensitive E. Coli   - Ucx from 12/2024 with E. Coli, resistant to cipro. Bactrim, and levaquin; sens to CTX  - UA neg in ED but patient has been on multiple abx   - s/p CTX x1 in ED   - will continue on CTX for now, pending procal/UCx/BCx  - if infectious etiology ruled out, can trial pyridium     # pulm HTN - CTD related   - co-managed with Dr. Farah and Dr. Alicia   - Echo 12/2024: EF 60%, mild MR, mild TR, PASP 44   - patient uses 3L NC at night   - cont home ambrisentan 10mg daily (have to confirm with pharm per pharm here - pt uses [facility] on [address])   - cont home tadalafil 40mg daily (non-formulary - need to confirm with pharm per pharm here)   - hold home Lasix 20mg daily and spironolactone 50mg daily     # hx slceroderma   # hx RA   - cont home hydroxychlorquine 200mg daily     # digoxin use - afib?    - patient is on digoxin 0.125mcg daily - unclear why, states she was started by Dr. Alicia     # Misc   - DVT ppx: Lovenox   - GI ppx: N/A  - Diet: Regular  - Activity: IAT  - Dispo: SDU  - Pending: Na correction, CTH, infectious w/u 75-year-old female with past medical history of pulm HTN, scleroderma, rheumatoid arthritis, presenting with dysuria, nausea and vomiting.  Patient reports urinary symptoms for the past few weeks. She states she has been on 3 different courses of abx, was on Bactrim about 8 days ago then started on Keflex for last 4 days, still with some dysuria. Patients states that yesterday she started feeling very dizzy and lightheaded, when she went to get up from her bed she felt like the floor was swaying beneath her and she had to hold on to something to walk to the bathroom. She called her PCP who told her to stop taking the antibiotic. She notes that today she woke up with nausea, 7/10 headache, lightheadedness, and blurry vision. She also notes dry cough that started around 3 days ago. She states she has had normal appeitite and has been eating and drinking normally, reports she acutally has been drinking a little more water than usual because of her UTI (about 2-3 12oz water bottles a day). Patient reports episode of nausea and emesis in the ED, unable to hold down food and water today. She lives alone, independent in ADLs, occasionally uses cane. Otherwise denies fever, chills, chest pain, shortness of breath, abdominal pain, diarrhea, constipation, recent falls or trauma.     # severe symptomatic hyponatremia - unclear baseline - medication induced vs hypovolemic vs r/o infectious   - patient reporting dizziness, lightheaded, feeling like room is swaying, N/V, 7/10 headache, and blurry vision x2 days  - also notes dry cough that started 3 days ago    - CXR without focal consolidation   - Na 116 on admission, unclear baseline; was 129 in Nov 2024, prior to that was 140s (Sept/Oct 2024)   - spoke to nephrology, can do hypertonic saline; will do 25cc/hr x4 hrs  - Na 116 -> 120 after 1L LR -> 117 after 2 hours hypertonic   - serum osm is low, urine sodium is >20 (but patient is on diuretics), urine osm is 300 - bactrim induced SIADH?  - hold home spironolactone 50mg daily and furosemide 20mg daily   - EKG NSR, no DC or QT changes  - f/u formal nephro eval   - f/u CTH   - f/u flu, urine legionella   - check digoxin level  - BMP q4h; goal Na correction 6-8meQ in 24h   - fall and seizure precautions     # dysuria - failed outpt abx?   - patient states she has had 3 different courses of abx, she doesnt remember exactly what, was on Bactrim x 8days, currently on Keflex, still reporting dysuria   - outpt UCx from 9/12/23 with pansensitive E. Coli   - Ucx from 12/2024 with E. Coli, resistant to cipro. Bactrim, and levaquin; sens to CTX  - UA neg in ED but patient has been on multiple abx   - s/p CTX x1 in ED   - will continue on CTX for now, pending procal/UCx/BCx  - if infectious etiology ruled out, can trial pyridium     # pulm HTN - CTD related   - co-managed with Dr. Farah and Dr. Alicia   - Echo 12/2024: EF 60%, mild MR, mild TR, PASP 44   - patient uses 3L NC at night   - cont home ambrisentan 10mg daily (have to confirm with pharm per pharm here - pt uses [facility] on [address])   - cont home tadalafil 40mg daily (non-formulary - need to confirm with pharm per pharm here)   - hold home Lasix 20mg daily and spironolactone 50mg daily     # hx slceroderma   # hx RA   - cont home hydroxychlorquine 200mg daily     # digoxin use - afib?    - patient is on digoxin 0.125mcg daily - unclear why, states she was started by Dr. Alicia     # Misc   - DVT ppx: Lovenox   - GI ppx: N/A  - Diet: Regular  - Activity: IAT  - Dispo: SDU  - Pending: Na correction, CTH, infectious w/u

## 2025-05-01 NOTE — H&P ADULT - HISTORY OF PRESENT ILLNESS
75-year-old female with past medical history of pulm hypertension, scleroderma, rheumatoid arthritis, CHF, presenting with dysuria, nausea and vomiting.  Patient reports urinary symptoms for the past few weeks.  Patient has been on 3 antibiotic courses outpatient.  Last 2 courses were Bactrim 8 days ago and then she was started on cephalexin 4 days ago.  Patient has taken 4 days of cephalexin without improvement in his symptoms.  Patient still endorsing dysuria.  Patient reports nausea vomiting that began today.  Patient unable to hold down food and water.  Patient endorsing cough and lightheadedness.  No headache, chest pain, shortness of breath, abdominal pain, diarrhea, constipation.  Patient has no other complaints at this time.    Vitals:   · BP Systolic	145 mm Hg  · BP Diastolic	65 mm Hg  · Heart Rate	73 /min  · Respiration Rate (breaths/min)	 70 /min (suspect inaccurate documentation)   · Temp (F)	98.4 Degrees F  · SpO2 (%)	94 % on RA     Labs:   WBC 3.54  Hgb 9.5  platelets 187  Na 116 (129 in Nov 2024, 140s prior to this)  K 4.2  Cr 0.6   UA neg     Imaging:   CXR:  75-year-old female with past medical history of pulm hypertension, scleroderma, rheumatoid arthritis, CHF, presenting with dysuria, nausea and vomiting.  Patient reports urinary symptoms for the past few weeks.  Patient has been on 3 antibiotic courses outpatient.  Last 2 courses were Bactrim 8 days ago and then she was started on cephalexin 4 days ago.  Patient has taken 4 days of cephalexin without improvement in his symptoms.  Patient still endorsing dysuria.  Patient reports nausea vomiting that began today.  Patient unable to hold down food and water.  Patient endorsing cough and lightheadedness.  No headache, chest pain, shortness of breath, abdominal pain, diarrhea, constipation.  Patient has no other complaints at this time.    Vitals:   · BP Systolic	145 mm Hg  · BP Diastolic	65 mm Hg  · Heart Rate	73 /min  · Respiration Rate (breaths/min)	 70 /min (suspect inaccurate documentation)   · Temp (F)	98.4 Degrees F  · SpO2 (%)	94 % on RA     Labs:   WBC 3.54  Hgb 9.5  platelets 187  Na 116 (129 in Nov 2024, 140s prior to this)  K 4.2  Cr 0.6   UA neg     Imaging:   CXR: no focal consolidation, effusion, or PTX 75-year-old female with past medical history of pulm HTN, scleroderma, rheumatoid arthritis, presenting with dysuria, nausea and vomiting.  Patient reports urinary symptoms for the past few weeks. She states she has been on 3 different courses of abx, was on Bactrim about 8 days ago then started on Keflex for last 4 days, still with some dysuria. Patients states that yesterday she started feeling very dizzy and lightheaded, when she went to get up from her bed she felt like the floor was swaying beneath her and she had to hold on to something to walk to the bathroom. She called her PCP who told her to stop taking the antibiotic. She notes that today she woke up with nausea, 7/10 headache, lightheadedness, and blurry vision. She also notes dry cough that started around 3 days ago. She states she has had normal appeitite and has been eating and drinking normally, reports she acutally has been drinking a little more water than usual because of her UTI (about 2-3 12oz water bottles a day). Patient reports episode of nausea and emesis in the ED, unable to hold down food and water today. She lives alone, independent in ADLs, occasionally uses cane. Otherwise denies fever, chills, chest pain, shortness of breath, abdominal pain, diarrhea, constipation, recent falls or trauma.     Vitals:   · BP Systolic	145 mm Hg  · BP Diastolic	65 mm Hg  · Heart Rate	73 /min  · Respiration Rate (breaths/min)	 70 /min (suspect inaccurate documentation)   · Temp (F)	98.4 Degrees F  · SpO2 (%)	94 % on RA     Labs:   WBC 3.54  Hgb 9.5  platelets 187  Na 116 (129 in Nov 2024, 140s prior to this)  K 4.2  Cr 0.6   UA neg     Imaging:   CXR: no focal consolidation, effusion, or PTX 75-year-old female with past medical history of pulm HTN, scleroderma, rheumatoid arthritis, presenting with dysuria, nausea and vomiting.  Patient reports urinary symptoms for the past few weeks. She states she has been on 3 different courses of abx, was on Bactrim about 8 days ago then started on Keflex for last 4 days, still with some dysuria. Patients states that yesterday she started feeling very dizzy and lightheaded, when she went to get up from her bed she felt like the floor was swaying beneath her and she had to hold on to something to walk to the bathroom. She called her PCP who told her to stop taking the antibiotic. She notes that today she woke up with nausea, 7/10 headache, lightheadedness, and blurry vision. She also notes dry cough that started around 3 days ago. She states she has had normal appeitite and has been eating and drinking normally, reports she acutally has been drinking a little more water than usual because of her UTI (about 2-3 12oz water bottles a day). Patient reports episode of nausea and emesis in the ED, unable to hold down food and water today. She lives alone, independent in ADLs, occasionally uses cane. Otherwise denies fever, chills, chest pain, shortness of breath, abdominal pain, diarrhea, constipation, recent falls or trauma.     Vitals:   · BP Systolic	145 mm Hg  · BP Diastolic	65 mm Hg  · Heart Rate	73 /min  · Respiration Rate (breaths/min)	 70 /min (suspect inaccurate documentation)   · Temp (F)	98.4 Degrees F  · SpO2 (%)	94 % on RA     Labs:   WBC 3.54  Hgb 9.5  platelets 187  Na 116 (129 in Nov 2024, 140s prior to this)  K 4.2  Cr 0.6   UA neg     Imaging:   CXR: no focal consolidation, effusion, or PTX    Management in the ED:   CTX, 1L LR  75-year-old female with past medical history of pulm HTN, scleroderma, rheumatoid arthritis, presenting with dysuria, nausea and vomiting.  Patient reports urinary symptoms for the past few weeks. She states she has been on 3 different courses of abx, was on Bactrim about 8 days ago then started on Keflex for last 4 days, still with some dysuria. Patients states that yesterday she started feeling very dizzy and lightheaded, when she went to get up from her bed she felt like the floor was swaying beneath her and she had to hold on to something to walk to the bathroom. She called her PCP who told her to stop taking the antibiotic. She notes that today she woke up with nausea, 7/10 headache, lightheadedness, and blurry vision. She also notes dry cough that started around 3 days ago. She states she has had normal appetite and has been eating and drinking normally, reports she acutally has been drinking a little more water than usual because of her UTI (about 2-3 12oz water bottles a day). Patient reports episode of nausea and emesis in the ED, unable to hold down food and water today. She lives alone, independent in ADLs, occasionally uses cane. Otherwise denies fever, chills, chest pain, shortness of breath, abdominal pain, diarrhea, constipation, recent falls or trauma.     Vitals:   · BP Systolic	145 mm Hg  · BP Diastolic	65 mm Hg  · Heart Rate	73 /min  · Respiration Rate (breaths/min)	 70 /min (suspect inaccurate documentation)   · Temp (F)	98.4 Degrees F  · SpO2 (%)	94 % on RA     Labs:   WBC 3.54  Hgb 9.5  platelets 187  Na 116 (129 in Nov 2024, 140s prior to this)  K 4.2  Cr 0.6   UA neg     Imaging:   CXR: no focal consolidation, effusion, or PTX    Management in the ED:   CTX, 1L LR

## 2025-05-01 NOTE — ED ADULT TRIAGE NOTE - CHIEF COMPLAINT QUOTE
Pt here with urinary tract infection reports has changed medications x3 in past 10 days. Pt reports feeling weak and having continued burning on urination

## 2025-05-01 NOTE — H&P ADULT - NSHPPHYSICALEXAM_GEN_ALL_CORE
GENERAL: NAD  HEENT: Sclera clear, EOMI, PERRLA  NECK: Supple, no stiffness, no JVD  CARDIO: Regular rate and rhythm, normal s1s2  RESPIRATORY: Unlabored respirations, b/l air entry, no wheezing or crackles   ABDOMEN: Soft, nontender, nondistended; +BS  EXTREMITIES: No cyanosis or edema, pitting edema of feet (baseline per patient)  NEURO: AOx3  SKIN: Warm and dry

## 2025-05-01 NOTE — H&P ADULT - ATTENDING COMMENTS
75-year-old female with past medical history of pulm HTN, scleroderma, rheumatoid arthritis, presenting with dysuria, nausea and vomiting.  Patient reports urinary symptoms for the past few weeks. She states she has been on 3 different courses of abx, was on Bactrim about 8 days ago then started on Keflex for last 4 days, still with some dysuria. Patients states that yesterday she started feeling very dizzy and lightheaded, when she went to get up from her bed she felt like the floor was swaying beneath her and she had to hold on to something to walk to the bathroom. She called her PCP who told her to stop taking the antibiotic. She notes that today she woke up with nausea, 7/10 headache, lightheadedness, and blurry vision. She also notes dry cough that started around 3 days ago. She states she has had normal appeitite and has been eating and drinking normally, reports she acutally has been drinking a little more water than usual because of her UTI (about 2-3 12oz water bottles a day). Patient reports episode of nausea and emesis in the ED,     Agree  with assessment  except for changes below.   Vital Signs Last 24 Hrs  T(C): 36.9 (01 May 2025 17:57), Max: 36.9 (01 May 2025 17:57)  T(F): 98.4 (01 May 2025 17:57), Max: 98.4 (01 May 2025 17:57)  HR: 73 (01 May 2025 17:57) (73 - 73)  BP: 120/62 (01 May 2025 21:00) (113/55 - 145/65)  BP(mean): 75 (01 May 2025 20:35) (75 - 75)  RR: 20 (01 May 2025 19:25) (20 - 70)  SpO2: 95% (01 May 2025 19:25) (94% - 95%)    Parameters below as of 01 May 2025 19:25  Patient On (Oxygen Delivery Method): room air      IMPRESSION  Hyponatremia Hypoosmotic  Hypovolemia   Associated with nausea Vomiting   On  Lasix and Spironolactone   Urine Lytes  Inaccurate    Gastroenteritis   Non Septic   - Na 116 on admission, unclear baseline; was 129 in Nov 2024, prior to that was 140s (Sept/Oct 2024)   - repeat Na 120 s/p 1L LR   - spoke to nephrology, can do hypertonic saline; will do 25cc/hr x2 hrs and reassess given patient correction to 116 -> 120   - hold home spironolactone 50mg daily and furosemide 80mg daily   - EKG NSR, no ND or QT changes  - f/u formal nephro eval   - check urine studies, serum Osm   - f/u CTH   - f/u flu, urine legionella   - check digoxin level  - BMP q4h; goal Na correction 6-8meQ in 24h   - fall and seizure precautions   - Serial BMPs. Do not over correct (<8-10 in 24 hours).     Dysuria - failed outpt abx?   - patient states she has had 3 different courses of abx, she doesnt remember exactly what, was on Bactrim x 8days, currently on Keflex, still reporting dysuria   - outpt UCx from 9/12/23 with pansensitive E. Coli   - Ucx from 12/2024 with E. Coli, resistant to cipro. Bactrim, and levaquin; sens to CTX  - UA neg in ED but patient has been on multiple abx   - s/p CTX x1 in ED   - will continue on CTX for now, pending procal/UCx/BCx  -Consider pyridium     Hx  pulm HTN - CTD related   - co-managed with Dr. Farah and Dr. Alicia   - Echo 12/2024: EF 60%, mild MR, mild TR, PASP 44   - patient uses 3L NC at night   - cont home ambrisentan 10mg daily   - cont home tadalafil 40mg daily (non-formulary)   - hold home Lasix 80mg daily and spironolactone 50mg daily     Hx  Slceroderma   Hx  RA   - cont home hydroxychlorquine 400mg daily     Hx Digoxin   - patient is on digoxin 0.125mcg daily  - Please Obtain outpatient records 75-year-old female with past medical history of pulm HTN, scleroderma, rheumatoid arthritis, presenting with dysuria, nausea and vomiting.  Patient reports urinary symptoms for the past few weeks. She states she has been on 3 different courses of abx, was on Bactrim about 8 days ago then started on Keflex for last 4 days, still with some dysuria. Patients states that yesterday she started feeling very dizzy and lightheaded, when she went to get up from her bed she felt like the floor was swaying beneath her and she had to hold on to something to walk to the bathroom. She called her PCP who told her to stop taking the antibiotic. She notes that today she woke up with nausea, 7/10 headache, lightheadedness, and blurry vision. She also notes dry cough that started around 3 days ago. She states she has had normal appeitite and has been eating and drinking normally, reports she acutally has been drinking a little more water than usual because of her UTI (about 2-3 12oz water bottles a day). Patient reports episode of nausea and emesis in the ED,     Agree  with assessment  except for changes below.   Vital Signs Last 24 Hrs  T(C): 36.9 (01 May 2025 17:57), Max: 36.9 (01 May 2025 17:57)  T(F): 98.4 (01 May 2025 17:57), Max: 98.4 (01 May 2025 17:57)  HR: 73 (01 May 2025 17:57) (73 - 73)  BP: 120/62 (01 May 2025 21:00) (113/55 - 145/65)  BP(mean): 75 (01 May 2025 20:35) (75 - 75)  RR: 20 (01 May 2025 19:25) (20 - 70)  SpO2: 95% (01 May 2025 19:25) (94% - 95%)    Parameters below as of 01 May 2025 19:25  Patient On (Oxygen Delivery Method): room air      IMPRESSION  Hyponatremia Hypoosmotic  Hypovolemia   Associated with nausea Vomiting   On  Lasix and Spironolactone   Urine Lytes  Inaccurate    Gastroenteritis   Non Septic   - Na 116 on admission, unclear baseline; was 129 in Nov 2024, prior to that was 140s (Sept/Oct 2024)   - repeat Na 120 s/p 1L LR   - spoke to nephrology, can do hypertonic saline; will do 25cc/hr x2 hrs and reassess given patient correction to 116 -> 120   - hold home spironolactone 50mg daily and furosemide 20mg daily   - EKG NSR, no CA or QT changes  - f/u formal nephro eval   - check urine studies, serum Osm   - f/u CTH   - f/u flu, urine legionella   - check digoxin level  - BMP q4h; goal Na correction 6-8meQ in 24h   - fall and seizure precautions   - Serial BMPs. Do not over correct (<8-10 in 24 hours).     Dysuria - failed outpt abx?   - patient states she has had 3 different courses of abx, she doesnt remember exactly what, was on Bactrim x 8days, currently on Keflex, still reporting dysuria   - outpt UCx from 9/12/23 with pansensitive E. Coli   - Ucx from 12/2024 with E. Coli, resistant to cipro. Bactrim, and levaquin; sens to CTX  - UA neg in ED but patient has been on multiple abx   - s/p CTX x1 in ED   - will continue on CTX for now, pending procal/UCx/BCx  -Consider pyridium     Hx  pulm HTN - CTD related   - co-managed with Dr. Farah and Dr. Alicia   - Echo 12/2024: EF 60%, mild MR, mild TR, PASP 44   - patient uses 3L NC at night   - cont home ambrisentan 10mg daily   - cont home tadalafil 40mg daily (non-formulary)   - hold home Lasix 20mg daily and spironolactone 50mg daily     Hx  Slceroderma   Hx  RA   - cont home hydroxychlorquine 200mg daily     Hx Digoxin   - patient is on digoxin 0.125mcg daily  - Please Obtain outpatient records 75-year-old female with past medical history of pulm HTN, scleroderma, rheumatoid arthritis, presenting with dysuria, nausea and vomiting.  Patient reports urinary symptoms for the past few weeks. She states she has been on 3 different courses of abx, was on Bactrim about 8 days ago then started on Keflex for last 4 days, still with some dysuria. Patients states that yesterday she started feeling very dizzy and lightheaded, when she went to get up from her bed she felt like the floor was swaying beneath her and she had to hold on to something to walk to the bathroom. She called her PCP who told her to stop taking the antibiotic. She notes that today she woke up with nausea, 7/10 headache, lightheadedness, and blurry vision. She also notes dry cough that started around 3 days ago. She states she has had normal appeitite and has been eating and drinking normally, reports she acutally has been drinking a little more water than usual because of her UTI (about 2-3 12oz water bottles a day). Patient reports episode of nausea and emesis in the ED,     Agree  with assessment  except for changes below.   Vital Signs Last 24 Hrs  T(C): 36.9 (01 May 2025 17:57), Max: 36.9 (01 May 2025 17:57)  T(F): 98.4 (01 May 2025 17:57), Max: 98.4 (01 May 2025 17:57)  HR: 73 (01 May 2025 17:57) (73 - 73)  BP: 120/62 (01 May 2025 21:00) (113/55 - 145/65)  BP(mean): 75 (01 May 2025 20:35) (75 - 75)  RR: 20 (01 May 2025 19:25) (20 - 70)  SpO2: 95% (01 May 2025 19:25) (94% - 95%)    Parameters below as of 01 May 2025 19:25  Patient On (Oxygen Delivery Method): room air      IMPRESSION  Hyponatremia Hypoosmotic  Hypovolemia   Associated with nausea Vomiting   On  Lasix and Spironolactone   Urine Lytes  Inaccurate    Gastroenteritis   Non Septic   - Na 116 on admission, unclear baseline; was 129 in Nov 2024, prior to that was 140s (Sept/Oct 2024)   - repeat Na 120 s/p 1L LR   - spoke to nephrology, can do hypertonic saline; will do 25cc/hr x2 hrs and reassess given patient correction to 116 -> 120   - hold home spironolactone 50mg daily and furosemide 20mg daily   - EKG NSR, no VA or QT changes  - f/u formal nephro eval   - check urine studies, serum Osm   - f/u CTH   - f/u flu, urine legionella   - check digoxin level  - BMP q4h; goal Na correction 6-8meQ in 24h   - fall and seizure precautions   - Serial BMPs. Do not over correct (<8-10 in 24 hours).     Dysuria - improved   - patient states she has had 3 different courses of abx, she doesnt remember exactly what, was on Bactrim x 8days, currently on Keflex, still reporting dysuria   - outpt UCx from 9/12/23 with pansensitive E. Coli   - Ucx from 12/2024 with E. Coli, resistant to cipro. Bactrim, and levaquin; sens to CTX  - UA neg in ED but patient has been on multiple abx   - s/p CTX x1 in ED   - will continue on CTX for now, pending procal/UCx/BCx  -Consider pyridium     Hx  pulm HTN - CTD related   - co-managed with Dr. Farah and Dr. Alicia   - Echo 12/2024: EF 60%, mild MR, mild TR, PASP 44   - patient uses 3L NC at night   - cont home ambrisentan 10mg daily   - cont home tadalafil 40mg daily (non-formulary)   - hold home Lasix 20mg daily and spironolactone 50mg daily     Hx  Slceroderma   Hx  RA   - cont home hydroxychlorquine 200mg daily     Hx Digoxin   - patient is on digoxin 0.125mcg daily  - Please Obtain outpatient records    Seen on 05/01

## 2025-05-01 NOTE — H&P ADULT - NSHPLABSRESULTS_GEN_ALL_CORE
LABS:                          9.5    3.54  )-----------( 187      ( 01 May 2025 19:15 )             28.3     05-01    120[L]  |  87[L]  |  10  ----------------------------<  102[H]  4.3   |  22  |  0.5[L]    Ca    8.8      01 May 2025 22:23    TPro  6.2  /  Alb  4.1  /  TBili  <0.2  /  DBili  x   /  AST  21  /  ALT  13  /  AlkPhos  84  05-01    LIVER FUNCTIONS - ( 01 May 2025 19:15 )  Alb: 4.1 g/dL / Pro: 6.2 g/dL / ALK PHOS: 84 U/L / ALT: 13 U/L / AST: 21 U/L / GGT: x             Urinalysis Basic - ( 01 May 2025 22:23 )    Color: x / Appearance: x / SG: x / pH: x  Gluc: 102 mg/dL / Ketone: x  / Bili: x / Urobili: x   Blood: x / Protein: x / Nitrite: x   Leuk Esterase: x / RBC: x / WBC x   Sq Epi: x / Non Sq Epi: x / Bacteria: x

## 2025-05-01 NOTE — ED PROVIDER NOTE - CLINICAL SUMMARY MEDICAL DECISION MAKING FREE TEXT BOX
pt p/w cough, uti, found to  have new onset hyponatremia     [facility]ruz - case discussed with ICU Fellow Dr Dixon, approved for step down, approved by Dr Pressley . no seizure or AMS. had 1 episode post tussive emesis on arrival to ED. last Na 129 11/24, but prior to that usually 140 range.    Labs and EKG were ordered and reviewed.  Imaging was ordered and reviewed by me.  Appropriate medications for patient's presenting complaints were ordered and effects were reassessed.  Patient's records (prior hospital, ED visit, and/or nursing home notes if available) were reviewed.  Additional history was obtained from EMS, family, and/or PCP (where available).  Escalation to admission/observation was considered.  Patient requires inpatient hospitalization - monitored setting.

## 2025-05-01 NOTE — ED PROVIDER NOTE - PROGRESS NOTE DETAILS
[facility]ruz - case discussed with ICU Fellow Dr Dixon, approved for step down, approved by Dr Pressley . no seizure or AMS. had 1 episode post tussive emesis on arrival to ED. last Na 129 11/24, but prior to that usually 140 range.

## 2025-05-01 NOTE — ED PROVIDER NOTE - OBJECTIVE STATEMENT
75-year-old female with past medical history of pulm hypertension, scleroderma, rheumatoid Tritus, CHF, dysuria presenting with dysuria, nausea and vomiting.  Patient reports urinary symptoms for the past few weeks.  Patient has been on 3 antibiotic courses outpatient.  Last 2 courses were Bactrim 8 days ago and then she was started on cephalexin 4 days ago.  Patient has taken 4 days of cephalexin without improvement in his symptoms.  Patient still endorsing dysuria.  Patient reports nausea vomiting that began today.  Patient unable to hold down food and water.  Patient endorsing cough and lightheadedness.  No headache, chest pain, shortness of breath, abdominal pain, diarrhea, constipation.  Patient has no other complaints at this time.

## 2025-05-02 LAB
ANION GAP SERPL CALC-SCNC: 10 MMOL/L — SIGNIFICANT CHANGE UP (ref 7–14)
ANION GAP SERPL CALC-SCNC: 11 MMOL/L — SIGNIFICANT CHANGE UP (ref 7–14)
ANION GAP SERPL CALC-SCNC: 7 MMOL/L — SIGNIFICANT CHANGE UP (ref 7–14)
ANION GAP SERPL CALC-SCNC: 8 MMOL/L — SIGNIFICANT CHANGE UP (ref 7–14)
ANION GAP SERPL CALC-SCNC: 9 MMOL/L — SIGNIFICANT CHANGE UP (ref 7–14)
BASOPHILS # BLD AUTO: 0.03 K/UL — SIGNIFICANT CHANGE UP (ref 0–0.2)
BASOPHILS NFR BLD AUTO: 1.1 % — HIGH (ref 0–1)
BUN SERPL-MCNC: 11 MG/DL — SIGNIFICANT CHANGE UP (ref 10–20)
BUN SERPL-MCNC: 12 MG/DL — SIGNIFICANT CHANGE UP (ref 10–20)
BUN SERPL-MCNC: 9 MG/DL — LOW (ref 10–20)
CALCIUM SERPL-MCNC: 8.6 MG/DL — SIGNIFICANT CHANGE UP (ref 8.4–10.5)
CALCIUM SERPL-MCNC: 8.7 MG/DL — SIGNIFICANT CHANGE UP (ref 8.4–10.5)
CALCIUM SERPL-MCNC: 8.8 MG/DL — SIGNIFICANT CHANGE UP (ref 8.4–10.5)
CALCIUM SERPL-MCNC: 8.8 MG/DL — SIGNIFICANT CHANGE UP (ref 8.4–10.5)
CALCIUM SERPL-MCNC: 8.9 MG/DL — SIGNIFICANT CHANGE UP (ref 8.4–10.5)
CHLORIDE SERPL-SCNC: 87 MMOL/L — LOW (ref 98–110)
CHLORIDE SERPL-SCNC: 88 MMOL/L — LOW (ref 98–110)
CHLORIDE SERPL-SCNC: 88 MMOL/L — LOW (ref 98–110)
CO2 SERPL-SCNC: 23 MMOL/L — SIGNIFICANT CHANGE UP (ref 17–32)
CO2 SERPL-SCNC: 24 MMOL/L — SIGNIFICANT CHANGE UP (ref 17–32)
CO2 SERPL-SCNC: 24 MMOL/L — SIGNIFICANT CHANGE UP (ref 17–32)
CREAT SERPL-MCNC: 0.5 MG/DL — LOW (ref 0.7–1.5)
CREAT SERPL-MCNC: 0.6 MG/DL — LOW (ref 0.7–1.5)
DIGOXIN SERPL-MCNC: 0.8 NG/ML — SIGNIFICANT CHANGE UP (ref 0.8–2)
EGFR: 93 ML/MIN/1.73M2 — SIGNIFICANT CHANGE UP
EGFR: 97 ML/MIN/1.73M2 — SIGNIFICANT CHANGE UP
EGFR: 97 ML/MIN/1.73M2 — SIGNIFICANT CHANGE UP
EOSINOPHIL # BLD AUTO: 0.11 K/UL — SIGNIFICANT CHANGE UP (ref 0–0.7)
EOSINOPHIL NFR BLD AUTO: 4 % — SIGNIFICANT CHANGE UP (ref 0–8)
GLUCOSE SERPL-MCNC: 82 MG/DL — SIGNIFICANT CHANGE UP (ref 70–99)
GLUCOSE SERPL-MCNC: 87 MG/DL — SIGNIFICANT CHANGE UP (ref 70–99)
GLUCOSE SERPL-MCNC: 87 MG/DL — SIGNIFICANT CHANGE UP (ref 70–99)
GLUCOSE SERPL-MCNC: 92 MG/DL — SIGNIFICANT CHANGE UP (ref 70–99)
GLUCOSE SERPL-MCNC: 94 MG/DL — SIGNIFICANT CHANGE UP (ref 70–99)
HCT VFR BLD CALC: 26.9 % — LOW (ref 37–47)
HGB BLD-MCNC: 8.9 G/DL — LOW (ref 12–16)
IMM GRANULOCYTES NFR BLD AUTO: 0.7 % — HIGH (ref 0.1–0.3)
IRON SATN MFR SERPL: 11 % — LOW (ref 15–50)
IRON SATN MFR SERPL: 35 UG/DL — SIGNIFICANT CHANGE UP (ref 35–150)
LYMPHOCYTES # BLD AUTO: 0.71 K/UL — LOW (ref 1.2–3.4)
LYMPHOCYTES # BLD AUTO: 25.8 % — SIGNIFICANT CHANGE UP (ref 20.5–51.1)
MAGNESIUM SERPL-MCNC: 1.8 MG/DL — SIGNIFICANT CHANGE UP (ref 1.8–2.4)
MCHC RBC-ENTMCNC: 30.8 PG — SIGNIFICANT CHANGE UP (ref 27–31)
MCHC RBC-ENTMCNC: 33.1 G/DL — SIGNIFICANT CHANGE UP (ref 32–37)
MCV RBC AUTO: 93.1 FL — SIGNIFICANT CHANGE UP (ref 81–99)
MONOCYTES # BLD AUTO: 0.33 K/UL — SIGNIFICANT CHANGE UP (ref 0.1–0.6)
MONOCYTES NFR BLD AUTO: 12 % — HIGH (ref 1.7–9.3)
MRSA PCR RESULT.: NEGATIVE — SIGNIFICANT CHANGE UP
NEUTROPHILS # BLD AUTO: 1.55 K/UL — SIGNIFICANT CHANGE UP (ref 1.4–6.5)
NEUTROPHILS NFR BLD AUTO: 56.4 % — SIGNIFICANT CHANGE UP (ref 42.2–75.2)
NRBC BLD AUTO-RTO: 0 /100 WBCS — SIGNIFICANT CHANGE UP (ref 0–0)
PLATELET # BLD AUTO: 160 K/UL — SIGNIFICANT CHANGE UP (ref 130–400)
PMV BLD: 10.1 FL — SIGNIFICANT CHANGE UP (ref 7.4–10.4)
POTASSIUM SERPL-MCNC: 4.2 MMOL/L — SIGNIFICANT CHANGE UP (ref 3.5–5)
POTASSIUM SERPL-MCNC: 4.2 MMOL/L — SIGNIFICANT CHANGE UP (ref 3.5–5)
POTASSIUM SERPL-MCNC: 4.4 MMOL/L — SIGNIFICANT CHANGE UP (ref 3.5–5)
POTASSIUM SERPL-MCNC: 4.5 MMOL/L — SIGNIFICANT CHANGE UP (ref 3.5–5)
POTASSIUM SERPL-MCNC: 4.6 MMOL/L — SIGNIFICANT CHANGE UP (ref 3.5–5)
POTASSIUM SERPL-SCNC: 4.2 MMOL/L — SIGNIFICANT CHANGE UP (ref 3.5–5)
POTASSIUM SERPL-SCNC: 4.2 MMOL/L — SIGNIFICANT CHANGE UP (ref 3.5–5)
POTASSIUM SERPL-SCNC: 4.4 MMOL/L — SIGNIFICANT CHANGE UP (ref 3.5–5)
POTASSIUM SERPL-SCNC: 4.5 MMOL/L — SIGNIFICANT CHANGE UP (ref 3.5–5)
POTASSIUM SERPL-SCNC: 4.6 MMOL/L — SIGNIFICANT CHANGE UP (ref 3.5–5)
PROCALCITONIN SERPL-MCNC: 0.03 NG/ML — SIGNIFICANT CHANGE UP (ref 0.02–0.1)
RBC # BLD: 2.89 M/UL — LOW (ref 4.2–5.4)
RBC # FLD: 15.3 % — HIGH (ref 11.5–14.5)
SODIUM SERPL-SCNC: 117 MMOL/L — CRITICAL LOW (ref 135–146)
SODIUM SERPL-SCNC: 119 MMOL/L — CRITICAL LOW (ref 135–146)
SODIUM SERPL-SCNC: 120 MMOL/L — LOW (ref 135–146)
SODIUM SERPL-SCNC: 121 MMOL/L — LOW (ref 135–146)
SODIUM SERPL-SCNC: 122 MMOL/L — LOW (ref 135–146)
TIBC SERPL-MCNC: 305 UG/DL — SIGNIFICANT CHANGE UP (ref 220–430)
TSH SERPL-MCNC: 2.51 UIU/ML — SIGNIFICANT CHANGE UP (ref 0.27–4.2)
UIBC SERPL-MCNC: 270 UG/DL — SIGNIFICANT CHANGE UP (ref 110–370)
URATE SERPL-MCNC: 2.8 MG/DL — SIGNIFICANT CHANGE UP (ref 2.5–7)
UUN UR-MCNC: 366 MG/DL — SIGNIFICANT CHANGE UP
WBC # BLD: 2.75 K/UL — LOW (ref 4.8–10.8)
WBC # FLD AUTO: 2.75 K/UL — LOW (ref 4.8–10.8)

## 2025-05-02 PROCEDURE — 99233 SBSQ HOSP IP/OBS HIGH 50: CPT

## 2025-05-02 PROCEDURE — 70450 CT HEAD/BRAIN W/O DYE: CPT | Mod: 26

## 2025-05-02 PROCEDURE — 99223 1ST HOSP IP/OBS HIGH 75: CPT

## 2025-05-02 RX ORDER — HYDROXYCHLOROQUINE SULFATE 200 MG/1
1 TABLET, FILM COATED ORAL
Refills: 0 | DISCHARGE

## 2025-05-02 RX ORDER — SODIUM CHLORIDE 3 G/100ML
500 INJECTION, SOLUTION INTRAVENOUS
Refills: 0 | Status: DISCONTINUED | OUTPATIENT
Start: 2025-05-02 | End: 2025-05-02

## 2025-05-02 RX ORDER — HYDROXYCHLOROQUINE SULFATE 200 MG/1
2 TABLET, FILM COATED ORAL
Refills: 0 | DISCHARGE

## 2025-05-02 RX ORDER — ENOXAPARIN SODIUM 100 MG/ML
40 INJECTION SUBCUTANEOUS EVERY 24 HOURS
Refills: 0 | Status: DISCONTINUED | OUTPATIENT
Start: 2025-05-02 | End: 2025-05-05

## 2025-05-02 RX ORDER — ONDANSETRON HCL/PF 4 MG/2 ML
4 VIAL (ML) INJECTION EVERY 6 HOURS
Refills: 0 | Status: DISCONTINUED | OUTPATIENT
Start: 2025-05-02 | End: 2025-05-05

## 2025-05-02 RX ORDER — CEFTRIAXONE 500 MG/1
1000 INJECTION, POWDER, FOR SOLUTION INTRAMUSCULAR; INTRAVENOUS EVERY 24 HOURS
Refills: 0 | Status: COMPLETED | OUTPATIENT
Start: 2025-05-02 | End: 2025-05-04

## 2025-05-02 RX ORDER — HYDROXYCHLOROQUINE SULFATE 200 MG/1
200 TABLET, FILM COATED ORAL DAILY
Refills: 0 | Status: DISCONTINUED | OUTPATIENT
Start: 2025-05-02 | End: 2025-05-05

## 2025-05-02 RX ORDER — FUROSEMIDE 10 MG/ML
2 INJECTION INTRAMUSCULAR; INTRAVENOUS
Refills: 0 | DISCHARGE

## 2025-05-02 RX ADMIN — Medication 650 MILLIGRAM(S): at 01:00

## 2025-05-02 RX ADMIN — CEFTRIAXONE 100 MILLIGRAM(S): 500 INJECTION, POWDER, FOR SOLUTION INTRAMUSCULAR; INTRAVENOUS at 22:50

## 2025-05-02 RX ADMIN — Medication 75 MILLILITER(S): at 21:39

## 2025-05-02 RX ADMIN — Medication 650 MILLIGRAM(S): at 00:37

## 2025-05-02 RX ADMIN — Medication 650 MILLIGRAM(S): at 06:00

## 2025-05-02 RX ADMIN — SODIUM CHLORIDE 25 MILLILITER(S): 3 INJECTION, SOLUTION INTRAVENOUS at 00:31

## 2025-05-02 RX ADMIN — ENOXAPARIN SODIUM 40 MILLIGRAM(S): 100 INJECTION SUBCUTANEOUS at 22:50

## 2025-05-02 RX ADMIN — Medication 75 MILLILITER(S): at 15:13

## 2025-05-02 RX ADMIN — Medication 4 MILLIGRAM(S): at 00:35

## 2025-05-02 RX ADMIN — HYDROXYCHLOROQUINE SULFATE 200 MILLIGRAM(S): 200 TABLET, FILM COATED ORAL at 12:18

## 2025-05-02 RX ADMIN — SODIUM CHLORIDE 25 MILLILITER(S): 3 INJECTION, SOLUTION INTRAVENOUS at 05:55

## 2025-05-02 RX ADMIN — Medication 0.25 MILLIGRAM(S): at 22:43

## 2025-05-02 RX ADMIN — Medication 0.25 MILLIGRAM(S): at 01:18

## 2025-05-02 NOTE — CONSULT NOTE ADULT - ATTENDING COMMENTS
IMPRESSION:    Acute hyponatremia, diuretics use vs possible bactrim toxicity  UTI was treated outpatient  H/o pulmonary HTN  H/o RA  H/o scleroderma      Plan as outlined above
Supriya Rocha is a 75 year old female who presented yesterday due to dizziness. She takes Lasix for HTN and Tadalfil?? She reports that it was over the past few hours and accompanied by nonbilious vomiting following excessive drinking    on exam no JVD no carotid murmur  Heart regular S1S2  lungs CTS  Abdomen soft  skin diffuse skin rash     # hyponatremia / hyposmolar with hIgh U osm c/w ADH presence  rule out prerenal hypovolemic etiology  sp 3% saline serum sodium up from 116 to 122  suggest resume IVF NS at 75 cc per hour   follow BMP if serum sodium worse this will be c/w SIAD at that time DC IVF and restrict free water  check TSH serum uric acid and AM cortisol  # anemia follow hb check fe studies

## 2025-05-02 NOTE — PROGRESS NOTE ADULT - SUBJECTIVE AND OBJECTIVE BOX
GENE LANGFORD  75y Female    CHIEF COMPLAINT:    Patient is a 75y old  Female who presents with a chief complaint of     INTERVAL HPI/OVERNIGHT EVENTS:    Patient seen and examined. No acute events overnight.     ROS: All other systems are negative.    Vital Signs:    T(F): 98.1 (05-02-25 @ 06:00), Max: 98.4 (05-01-25 @ 17:57)  HR: 76 (05-02-25 @ 06:00) (73 - 76)  BP: 131/72 (05-02-25 @ 06:00) (113/55 - 145/65)  RR: 18 (05-02-25 @ 06:00) (18 - 70)  SpO2: 96% (05-02-25 @ 06:00) (94% - 96%)    Daily Height in cm: 154.94 (01 May 2025 17:57)      PHYSICAL EXAM:    GENERAL:  NAD  SKIN: No rashes or lesions  HEENT: Atraumatic. Normocephalic. PERRL. Moist membranes.  NECK: Supple, No JVD. No lymphadenopathy.  PULMONARY: CTA B/L. No wheezing. No rales  CVS: Normal S1, S2. Rate and Rhythm are regular. No murmurs.  ABDOMEN/GI: Soft, Nontender, Nondistended; BS present  MSK:  No edema B/L LE.  NEUROLOGIC:  No motor or sensory deficit.  PSYCH: Alert & oriented x 3, normal affect    Consultant(s) Notes Reviewed:  [x ] YES  [ ] NO  Care Discussed with Consultants/Other Providers [ x] YES  [ ] NO    LABS:                        8.9    2.75  )-----------( 160      ( 02 May 2025 06:32 )             26.9     117[LL]  |  87[L]  |  9[L]  ----------------------------<  92  4.2   |  23  |  0.5[L]    Ca    8.8      02 May 2025 03:15    TPro  6.2  /  Alb  4.1  /  TBili  <0.2  /  DBili  x   /  AST  21  /  ALT  13  /  AlkPhos  84  05-01    RADIOLOGY & ADDITIONAL TESTS:  Imaging or report Personally Reviewed:  [x] YES  [ ] NO  EKG reviewed: [x] YES  [ ] NO    Medications:  Standing  ambrisentan 10 milliGRAM(s) Oral daily  cefTRIAXone   IVPB 1000 milliGRAM(s) IV Intermittent every 24 hours  chlorhexidine 2% Cloths 1 Application(s) Topical <User Schedule>  enoxaparin Injectable 40 milliGRAM(s) SubCutaneous every 24 hours  fluticasone propionate/ salmeterol 100-50 MICROgram(s) Diskus 1 Dose(s) Inhalation two times a day  hydroxychloroquine 200 milliGRAM(s) Oral daily  sodium chloride 3%. 500 milliLiter(s) IV Continuous <Continuous>  tiotropium 2.5 MICROgram(s) Inhaler 2 Puff(s) Inhalation daily    PRN Meds  acetaminophen     Tablet .. 650 milliGRAM(s) Oral every 6 hours PRN  ALPRAZolam 0.25 milliGRAM(s) Oral two times a day PRN  ondansetron Injectable 4 milliGRAM(s) IV Push every 6 hours PRN             GENE LANGFORD  75y Female    CHIEF COMPLAINT:    Patient is a 75y old  Female who presents with a chief complaint of     INTERVAL HPI/OVERNIGHT EVENTS:    Patient seen and examined. No acute events overnight. Complains of fatigue and headache     ROS: All other systems are negative.    Vital Signs:    T(F): 98.1 (05-02-25 @ 06:00), Max: 98.4 (05-01-25 @ 17:57)  HR: 76 (05-02-25 @ 06:00) (73 - 76)  BP: 131/72 (05-02-25 @ 06:00) (113/55 - 145/65)  RR: 18 (05-02-25 @ 06:00) (18 - 70)  SpO2: 96% (05-02-25 @ 06:00) (94% - 96%)    Daily Height in cm: 154.94 (01 May 2025 17:57)      PHYSICAL EXAM:    GENERAL:  NAD  SKIN: facial rash/bl palm rash, stable   HEENT: Atraumatic. Normocephalic.   NECK: Supple, No JVD.    PULMONARY: CTA B/L. No wheezing. No rales  CVS: Normal S1, S2. Rate and Rhythm are regular.   ABDOMEN/GI: Soft, Nontender, Nondistended   MSK:  No clubbing or cyanosis   NEUROLOGIC:  moves all extremities   PSYCH: Alert & oriented x 3, normal affect    Consultant(s) Notes Reviewed:  [x ] YES  [ ] NO  Care Discussed with Consultants/Other Providers [ x] YES  [ ] NO    LABS:                        8.9    2.75  )-----------( 160      ( 02 May 2025 06:32 )             26.9     117[LL]  |  87[L]  |  9[L]  ----------------------------<  92  4.2   |  23  |  0.5[L]    Ca    8.8      02 May 2025 03:15    TPro  6.2  /  Alb  4.1  /  TBili  <0.2  /  DBili  x   /  AST  21  /  ALT  13  /  AlkPhos  84  05-01    RADIOLOGY & ADDITIONAL TESTS:  Imaging or report Personally Reviewed:  [x] YES  [ ] NO  EKG reviewed: [x] YES  [ ] NO    Medications:  Standing  ambrisentan 10 milliGRAM(s) Oral daily  cefTRIAXone   IVPB 1000 milliGRAM(s) IV Intermittent every 24 hours  chlorhexidine 2% Cloths 1 Application(s) Topical <User Schedule>  enoxaparin Injectable 40 milliGRAM(s) SubCutaneous every 24 hours  fluticasone propionate/ salmeterol 100-50 MICROgram(s) Diskus 1 Dose(s) Inhalation two times a day  hydroxychloroquine 200 milliGRAM(s) Oral daily  sodium chloride 3%. 500 milliLiter(s) IV Continuous <Continuous>  tiotropium 2.5 MICROgram(s) Inhaler 2 Puff(s) Inhalation daily    PRN Meds  acetaminophen     Tablet .. 650 milliGRAM(s) Oral every 6 hours PRN  ALPRAZolam 0.25 milliGRAM(s) Oral two times a day PRN  ondansetron Injectable 4 milliGRAM(s) IV Push every 6 hours PRN

## 2025-05-02 NOTE — CONSULT NOTE ADULT - CONSULT REQUESTED BY NAME
[Spouse] : spouse
[FreeTextEntry2] : Patient is here today for bilaterial Synvisc one gel injections
primary care team
ED

## 2025-05-02 NOTE — PHYSICAL THERAPY INITIAL EVALUATION ADULT - ADDITIONAL COMMENTS
Patient lives alone in a house with 2 steps to enter with Rt rail, chair lift within the house. 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 Patient daughter lives closed by and check on her if needed help.

## 2025-05-02 NOTE — PHYSICAL THERAPY INITIAL EVALUATION ADULT - GAIT DISTANCE, PT EVAL
x 1 with turn, Patient with wobbly gait, uneven cadences. Patient stated that's her baseline./100 feet

## 2025-05-02 NOTE — PHYSICAL THERAPY INITIAL EVALUATION ADULT - GAIT TRAINING, PT EVAL
by discharge: Pt will amb 150 ft independently using RW as needed and negotiate 2 steps with Independent using handrail.

## 2025-05-02 NOTE — PHYSICAL THERAPY INITIAL EVALUATION ADULT - PERTINENT HX OF CURRENT PROBLEM, REHAB EVAL
Patient is a 75-year-old female with past medical history of pulm HTN, scleroderma, rheumatoid arthritis, presented with dysuria, nausea and vomiting.  Patient reports urinary symptoms for the past few weeks. She states she has been on 3 different courses of abx, initially on Bactrim  then started on Keflex, still with some dysuria Day PTP, patient reports dizziness and lightheadedness, followed by nausea, 7/10 headache, lightheadedness, and blurry vision. Has been drinking 24-36 oz water daily. Admitted to SDU for symptomatic hyponatremia.

## 2025-05-02 NOTE — PHARMACOTHERAPY INTERVENTION NOTE - COMMENTS
Spoke with [facility] [phone] pharmacist Catalina and confirmed patient was taking Tadalafil 20 mg - 2 tablet daily and ambrisentan 10 mg daily

## 2025-05-02 NOTE — CONSULT NOTE ADULT - ASSESSMENT
IMPRESSION:    Acute hyponatremia, diuretics use vs possible bactrim toxicity  UTI was treated outpatient  H/o pulmonary HTN  H/o RA  H/o scleroderma      PLAN:    CNS: Avoid depressants. CT noted    HEENT: Oral care    PULMONARY: Shes on RA.  HOB @ 45 degrees.  Aspiration precautions     CARDIOVASCULAR: Keep euvolemic.    GI: Feeding.  Bowel regimen PRN    RENAL: Avoid overcorrection. BMP q4. Nephrology consult. Follow up lytes.  Correct as needed    INFECTIOUS DISEASE: Follow up cultures. Finish abx course    HEMATOLOGICAL:  DVT prophylaxis.    ENDOCRINE: Follow up FS. Insulin protocol if needed.    MUSCULOSKELETAL: AAT    SDU         IMPRESSION:    Acute hyponatremia, diuretics use vs possible bactrim toxicity  UTI was treated outpatient  H/o pulmonary HTN  H/o RA  H/o scleroderma      PLAN:    CNS: Avoid depressants. CT noted    HEENT: Oral care    PULMONARY: Shes on RA.  HOB @ 45 degrees.  Aspiration precautions     CARDIOVASCULAR: Keep euvolemic.    GI: Feeding.  Bowel regimen PRN    RENAL: Avoid overcorrection. BMP q4. Nephrology consult. Follow up lytes.  Correct as needed    INFECTIOUS DISEASE: Follow up cultures. Finish abx course    HEMATOLOGICAL:  DVT prophylaxis.    ENDOCRINE: Follow up FS. Insulin protocol if needed.    MUSCULOSKELETAL: AAT    SDU

## 2025-05-02 NOTE — CONSULT NOTE ADULT - SUBJECTIVE AND OBJECTIVE BOX
Patient is a 75y old  Female who presents with a chief complaint of     HPI:  75-year-old female with past medical history of pulm HTN, scleroderma, rheumatoid arthritis, presenting with dysuria, nausea and vomiting.  Patient reports urinary symptoms for the past few weeks. She states she has been on 3 different courses of abx, was on Bactrim about 8 days ago then started on Keflex for last 4 days, still with some dysuria. Patients states that yesterday she started feeling very dizzy and lightheaded, when she went to get up from her bed she felt like the floor was swaying beneath her and she had to hold on to something to walk to the bathroom. She called her PCP who told her to stop taking the antibiotic. She notes that today she woke up with nausea, 7/10 headache, lightheadedness, and blurry vision. She also notes dry cough that started around 3 days ago. She states she has had normal appetite and has been eating and drinking normally, reports she acutally has been drinking a little more water than usual because of her UTI (about 2-3 12oz water bottles a day). Patient reports episode of nausea and emesis in the ED, unable to hold down food and water today. She lives alone, independent in ADLs, occasionally uses cane. Otherwise denies fever, chills, chest pain, shortness of breath, abdominal pain, diarrhea, constipation, recent falls or trauma.     Vitals:   · BP Systolic	145 mm Hg  · BP Diastolic	65 mm Hg  · Heart Rate	73 /min  · Respiration Rate (breaths/min)	 70 /min (suspect inaccurate documentation)   · Temp (F)	98.4 Degrees F  · SpO2 (%)	94 % on RA     Labs:   WBC 3.54  Hgb 9.5  platelets 187  Na 116 (129 in Nov 2024, 140s prior to this)  K 4.2  Cr 0.6   UA neg     Imaging:   CXR: no focal consolidation, effusion, or PTX    Management in the ED:   CTX, 1L LR  (01 May 2025 22:17)      PAST MEDICAL & SURGICAL HISTORY:          FAMILY HISTORY:  :  No known cardiovacular family hisotry     Review Of Systems:     All ROS are negative except per HPI       Allergies    penicillin (Rash)  vancomycin (Anaphylaxis)    Intolerances          PHYSICAL EXAM    ICU Vital Signs Last 24 Hrs  T(C): 36.7 (02 May 2025 06:00), Max: 36.9 (01 May 2025 17:57)  T(F): 98.1 (02 May 2025 06:00), Max: 98.4 (01 May 2025 17:57)  HR: 76 (02 May 2025 06:00) (73 - 76)  BP: 131/72 (02 May 2025 06:00) (113/55 - 145/65)  BP(mean): 75 (01 May 2025 20:35) (75 - 75)    RR: 18 (02 May 2025 06:00) (18 - 70)  SpO2: 96% (02 May 2025 06:00) (94% - 96%)    O2 Parameters below as of 02 May 2025 06:00  Patient On (Oxygen Delivery Method): nasal cannula  O2 Flow (L/min): 3          CONSTITUTIONAL:  NAD    ENT:   Airway patent,       EYES:   pupils equal,   round and reactive to light.    CARDIAC:   Normal rate,   Regular rhythm.    Heart sounds S1, S2.       RESPIRATORY:   No wheezing   Normal chest expansion  No use of accessory muscles    GASTROINTESTINAL:  Abdomen soft   Non-tender,   No guarding,   + BS      MUSCULOSKELETAL:   Nno clubbing, cyanosis    NEUROLOGICAL:   Alert and oriented     SKIN:   Skin normal color for race,                 LABS:                          8.9    2.75  )-----------( 160      ( 02 May 2025 06:32 )             26.9                                               05-02    120[L]  |  88[L]  |  9[L]  ----------------------------<  87  4.5   |  24  |  0.6[L]    Ca    8.6      02 May 2025 06:32  Mg     1.8     05-02    TPro  6.2  /  Alb  4.1  /  TBili  <0.2  /  DBili  x   /  AST  21  /  ALT  13  /  AlkPhos  84  05-01                                             Urinalysis Basic - ( 02 May 2025 06:32 )    Color: x / Appearance: x / SG: x / pH: x  Gluc: 87 mg/dL / Ketone: x  / Bili: x / Urobili: x   Blood: x / Protein: x / Nitrite: x   Leuk Esterase: x / RBC: x / WBC x   Sq Epi: x / Non Sq Epi: x / Bacteria: x                                                  LIVER FUNCTIONS - ( 01 May 2025 19:15 )  Alb: 4.1 g/dL / Pro: 6.2 g/dL / ALK PHOS: 84 U/L / ALT: 13 U/L / AST: 21 U/L / GGT: x                                                                                                                                       X-Rays reviewed:                                                                                    ECHO    CXR interpreted by me:    MEDICATIONS  (STANDING):  ambrisentan 10 milliGRAM(s) Oral daily  cefTRIAXone   IVPB 1000 milliGRAM(s) IV Intermittent every 24 hours  chlorhexidine 2% Cloths 1 Application(s) Topical <User Schedule>  enoxaparin Injectable 40 milliGRAM(s) SubCutaneous every 24 hours  fluticasone propionate/ salmeterol 100-50 MICROgram(s) Diskus 1 Dose(s) Inhalation two times a day  hydroxychloroquine 200 milliGRAM(s) Oral daily  sodium chloride 3%. 500 milliLiter(s) (25 mL/Hr) IV Continuous <Continuous>  tiotropium 2.5 MICROgram(s) Inhaler 2 Puff(s) Inhalation daily    MEDICATIONS  (PRN):  acetaminophen     Tablet .. 650 milliGRAM(s) Oral every 6 hours PRN Temp greater or equal to 38C (100.4F), Mild Pain (1 - 3)  ALPRAZolam 0.25 milliGRAM(s) Oral two times a day PRN for anxiety  ondansetron Injectable 4 milliGRAM(s) IV Push every 6 hours PRN Nausea and/or Vomiting

## 2025-05-02 NOTE — PROGRESS NOTE ADULT - SUBJECTIVE AND OBJECTIVE BOX
Patient is a 75y old  Female who presents with a chief complaint of     INTERVAL HPI/OVERNIGHT EVENTS: transferred to [facility]    acetaminophen     Tablet .. 650 milliGRAM(s) Oral every 6 hours PRN  ALPRAZolam 0.25 milliGRAM(s) Oral two times a day PRN  ambrisentan 10 milliGRAM(s) Oral daily  cefTRIAXone   IVPB 1000 milliGRAM(s) IV Intermittent every 24 hours  chlorhexidine 2% Cloths 1 Application(s) Topical <User Schedule>  enoxaparin Injectable 40 milliGRAM(s) SubCutaneous every 24 hours  fluticasone propionate/ salmeterol 100-50 MICROgram(s) Diskus 1 Dose(s) Inhalation two times a day  hydroxychloroquine 200 milliGRAM(s) Oral daily  ondansetron Injectable 4 milliGRAM(s) IV Push every 6 hours PRN  sodium chloride 3%. 500 milliLiter(s) IV Continuous <Continuous>  tiotropium 2.5 MICROgram(s) Inhaler 2 Puff(s) Inhalation daily      REVIEW OF SYSTEMS:  CONSTITUTIONAL: No fever, chills  EYES: No eye pain, visual disturbances, or discharge  ENMT:  No difficulty hearing, tinnitus, vertigo; No sinus or throat pain  RESPIRATORY: No cough, wheezing, chills or hemoptysis; No shortness of breath  CARDIOVASCULAR: No chest pain, palpitations  GASTROINTESTINAL: No abdominal pain. No nausea, vomiting, or diarrhea  GENITOURINARY: No dysuria  NEUROLOGICAL: No HA  SKIN: No itching, burning, rashes, or lesions   ENDOCRINE: No heat or cold intolerance; No hair loss  PSYCHIATRIC: No depression, anxiety, mood swings, or difficulty sleeping      T(C): 36 (05-02-25 @ 08:00), Max: 36.9 (05-01-25 @ 17:57)  HR: 70 (05-02-25 @ 08:00) (70 - 76)  BP: 118/54 (05-02-25 @ 08:00) (113/55 - 145/65)  RR: 18 (05-02-25 @ 08:00) (18 - 70)  SpO2: 98% (05-02-25 @ 08:00) (94% - 98%)  Wt(kg): --Vital Signs Last 24 Hrs  T(C): 36 (02 May 2025 08:00), Max: 36.9 (01 May 2025 17:57)  T(F): 96.8 (02 May 2025 08:00), Max: 98.4 (01 May 2025 17:57)  HR: 70 (02 May 2025 08:00) (70 - 76)  BP: 118/54 (02 May 2025 08:00) (113/55 - 145/65)  BP(mean): 75 (02 May 2025 08:00) (75 - 75)  RR: 18 (02 May 2025 08:00) (18 - 70)  SpO2: 98% (02 May 2025 08:00) (94% - 98%)    Parameters below as of 02 May 2025 06:00  Patient On (Oxygen Delivery Method): nasal cannula  O2 Flow (L/min): 3      PHYSICAL EXAM:  GENERAL: NAD, well-groomed, well-developed  HEAD:  Atraumatic, Normocephalic  EYES: EOMI, PERRLA, conjunctiva and sclera clear  ENMT: No tonsillar erythema, exudates, or enlargement; Moist mucous membranes  NECK: Supple, No JVD, Normal thyroid  CHEST/LUNG: Clear to auscultation bilaterally; No rales, rhonchi, wheezing, or rubs  HEART: Regular rate and rhythm; No murmurs, rubs, or gallops  ABDOMEN: Soft, Nontender, Nondistended; Bowel sounds present  NEURO: Alert & Oriented X3  EXTREMITIES: No LE edema, no calf tenderness  LYMPH: No lymphadenopathy noted  SKIN: No rashes or lesions    Consultant(s) Notes Reviewed:  [x ] YES  [ ] NO  Care Discussed with Consultants/Other Providers [ x] YES  [ ] NO    LABS:                        8.9    2.75  )-----------( 160      ( 02 May 2025 06:32 )             26.9     05-02    120[L]  |  88[L]  |  9[L]  ----------------------------<  87  4.5   |  24  |  0.6[L]    Ca    8.6      02 May 2025 06:32  Mg     1.8     05-02    TPro  6.2  /  Alb  4.1  /  TBili  <0.2  /  DBili  x   /  AST  21  /  ALT  13  /  AlkPhos  84  05-01      Urinalysis Basic - ( 02 May 2025 06:32 )    Color: x / Appearance: x / SG: x / pH: x  Gluc: 87 mg/dL / Ketone: x  / Bili: x / Urobili: x   Blood: x / Protein: x / Nitrite: x   Leuk Esterase: x / RBC: x / WBC x   Sq Epi: x / Non Sq Epi: x / Bacteria: x      CAPILLARY BLOOD GLUCOSE            Urinalysis Basic - ( 02 May 2025 06:32 )    Color: x / Appearance: x / SG: x / pH: x  Gluc: 87 mg/dL / Ketone: x  / Bili: x / Urobili: x   Blood: x / Protein: x / Nitrite: x   Leuk Esterase: x / RBC: x / WBC x   Sq Epi: x / Non Sq Epi: x / Bacteria: x

## 2025-05-02 NOTE — PHYSICAL THERAPY INITIAL EVALUATION ADULT - GENERAL OBSERVATIONS, REHAB EVAL
09:00-09:30. Patient was seen for PT IE. Patient was rec'd in semi reclined in bed, +IVL, NAD, + 3LT NCO2 with 97% SPO2 at rest and 96% during exertions, BP in bed 117/69, HR:70, post ambulation 133/75, HR:77, agreeable to participate in PT.

## 2025-05-02 NOTE — CONSULT NOTE ADULT - ASSESSMENT
# hyponatremia/ hypovolemia possibly SIADH    -Switch to normal saline, insure correction of 6-8 for Na  -BMP, TSH  -order AM cortisol  - # hyponatremia/ hypovolemia possibly SIADH    -Switch to normal saline, insure correction of 6-8 for Na  -BMP, TSH  -order AM cortisol   Supriya Rocha is a 75 year old female who presented with dizziness, hyponatremia 116, hypochloremia 86, and anemia 8.9. 3% NaCl was provided and has since been stopped. Na increased to 120. No masses or causative anomalies were found on no-contrast head CT. Due to presentation and history differential is hyponatremia induced by hypovolemia, SIADH needs to be ruled out.    # hyponatremia/ hypovolemia possibly SIADH  # anemia    -Switch to normal saline, insure correction of 6-8 for Na  -BMP, TSH  -order AM cortisol  -Uric acid  -fluid input output  -qt shift  -follow Hemoglobin trend Supriya Rocha is a 75 year old female who presented with dizziness, hyponatremia 116, hypochloremia 86, and anemia 8.9. 3% NaCl was provided and has since been stopped. Na increased to 120. No masses or causative anomalies were found on no-contrast head CT. Due to presentation and history differential is hyponatremia induced by hypovolemia, SIADH needs to be ruled out.    # hyponatremia/ hypovolemia possibly SIADH  # anemia    -Switch to normal saline, insure correction of 6-8 for Na  -BMP, TSH  -order AM cortisol  -Uric acid  -fluid input output  -qshift  -follow Hemoglobin trend

## 2025-05-02 NOTE — PATIENT PROFILE ADULT - FALL HARM RISK - HARM RISK INTERVENTIONS

## 2025-05-02 NOTE — PHYSICAL THERAPY INITIAL EVALUATION ADULT - RANGE OF MOTION EXAMINATION, REHAB EVAL
Rt ankle limited DF/PF/bilateral upper extremity ROM was WFL (within functional limits)/bilateral lower extremity ROM was WFL (within functional limits)

## 2025-05-02 NOTE — CONSULT NOTE ADULT - SUBJECTIVE AND OBJECTIVE BOX
24H events:    Patient is a 75y old Female who presents with a chief complaint of dizziness   Primary diagnosis of Hyponatremia      Day 1:  Day 2:  Day 3:     Today is hospital day 1d. This morning patient was seen and examined at bedside, sittin comfortably in a chair.    No acute or major events overnight. Hemodynamically stable, tolerating oral diet, voiding appropriately, mild dysuria with appropriate bowel movements.     Code Status:    Family communication:  Contact date:  Name of person contacted:  Relationship to patient:  Communication details:  What matters most:    PAST MEDICAL & SURGICAL HISTORY  HTN  Schleroderma  RA  SOCIAL HISTORY:  Social History:      ALLERGIES:  penicillin (Rash)  vancomycin (Anaphylaxis)    MEDICATIONS:  STANDING MEDICATIONS  ambrisentan 10 milliGRAM(s) Oral daily  cefTRIAXone   IVPB 1000 milliGRAM(s) IV Intermittent every 24 hours  chlorhexidine 2% Cloths 1 Application(s) Topical <User Schedule>  enoxaparin Injectable 40 milliGRAM(s) SubCutaneous every 24 hours  fluticasone propionate/ salmeterol 100-50 MICROgram(s) Diskus 1 Dose(s) Inhalation two times a day  hydroxychloroquine 200 milliGRAM(s) Oral daily  tiotropium 2.5 MICROgram(s) Inhaler 2 Puff(s) Inhalation daily    PRN MEDICATIONS  acetaminophen     Tablet .. 650 milliGRAM(s) Oral every 6 hours PRN  ALPRAZolam 0.25 milliGRAM(s) Oral two times a day PRN  ondansetron Injectable 4 milliGRAM(s) IV Push every 6 hours PRN    VITALS:   T(F): 96.8  HR: 70  BP: 118/54  RR: 18  SpO2: 98%    PHYSICAL EXAM:  GENERAL: NAD, sitting in a chair comfortably, cooperative,   HEAD: NCAD, no hematoma or laceration   NECK: Supple, no neck stiffness/nuchal rigidity, no JVD    HEART: Regular rate and rhythm, normal S1/S2, no murmurs, heaves, thrills  LUNGS: No acute respiratory distress, clear b/l breath sounds, no wheezing, rales, rhonchi  ABDOMEN:  soft, non-tender, non-distented, + BS, no hepatosplenomegaly   EXTREMITIES: no rashes, extremities warm/dry, no cyanosis, right JORGE LUIS in ankle, no ulcerations or ecchymosis  NERVOUS SYSTEM:  A&Ox3, CNII-XII intace, follows commands, answers questions appropriately   SKIN: No rashes or lesions           LABS:                        8.9    2.75  )-----------( 160      ( 02 May 2025 06:32 )             26.9     05-02    120[L]  |  88[L]  |  9[L]  ----------------------------<  87  4.5   |  24  |  0.6[L]    Ca    8.6      02 May 2025 06:32  Mg     1.8     05-02    TPro  6.2  /  Alb  4.1  /  TBili  <0.2  /  DBili  x   /  AST  21  /  ALT  13  /  AlkPhos  84  05-01      Urinalysis Basic - ( 02 May 2025 06:32 )    Color: clear / Appearance: x / SG: x / pH: x  Gluc: 87 mg/dL / Ketone: x  / Bili: x / Urobili: x   Blood: x / Protein: x / Nitrite: negative  Leuk Esterase: x / RBC: x / WBC x   Sq Epi: x / Non Sq Epi: x / Bacteria: x        Lactate, Blood: 0.7 mmol/L (05-01-25 @ 19:15)          RADIOLOGY:    RADIOLOGY       24H events:    Patient is a 75y old Female who presents with a chief complaint of dizziness   Primary diagnosis of Hyponatremia    Supriya Rocha is a 75 year old female who presented yesterday due to dizziness. She takes Lasix for HTN and Tadaliifl? She reports that it was over the past few hours and accompanied by nonbilious vomiting following excessive drinking. Prior to admission she was taking Bactrim and Keflex for a UTI. Patient states she does not eat well. Labs showed hyponatremia of 116/117 with hypochloremia of 86 her creatinine was 0.6 and BUN of 9. She was started on 3% Na+ fluid resuscitation which was stopped this morning. Na+ has since risen to 120 and Cl- to 88. Urine Sodium is 68 and 336 Osmolality    Today is hospital day 1d. This morning patient was seen and examined at bedside, sitting comfortably in a chair.    No acute or major events overnight. Hemodynamically stable, tolerating oral diet, voiding appropriately, mild dysuria with appropriate bowel movements.     Code Status:    Family communication:  Contact date:  Name of person contacted:  Relationship to patient:  Communication details:  What matters most:    PAST MEDICAL & SURGICAL HISTORY  HTN  Schleroderma  RA  SOCIAL HISTORY:  Social History:      ALLERGIES:  penicillin (Rash)  vancomycin (Anaphylaxis)    MEDICATIONS:  STANDING MEDICATIONS  ambrisentan 10 milliGRAM(s) Oral daily  cefTRIAXone   IVPB 1000 milliGRAM(s) IV Intermittent every 24 hours  chlorhexidine 2% Cloths 1 Application(s) Topical <User Schedule>  enoxaparin Injectable 40 milliGRAM(s) SubCutaneous every 24 hours  fluticasone propionate/ salmeterol 100-50 MICROgram(s) Diskus 1 Dose(s) Inhalation two times a day  hydroxychloroquine 200 milliGRAM(s) Oral daily  tiotropium 2.5 MICROgram(s) Inhaler 2 Puff(s) Inhalation daily    PRN MEDICATIONS  acetaminophen     Tablet .. 650 milliGRAM(s) Oral every 6 hours PRN  ALPRAZolam 0.25 milliGRAM(s) Oral two times a day PRN  ondansetron Injectable 4 milliGRAM(s) IV Push every 6 hours PRN    VITALS:   T(F): 96.8  HR: 70  BP: 118/54  RR: 18  SpO2: 98%    PHYSICAL EXAM:  GENERAL: NAD, sitting in a chair comfortably, cooperative,   HEAD: NCAD, no hematoma or laceration   NECK: Supple, no neck stiffness/nuchal rigidity, no JVD    HEART: Regular rate and rhythm, normal S1/S2, no murmurs, heaves, thrills  LUNGS: No acute respiratory distress, clear b/l breath sounds, no wheezing, rales, rhonchi  ABDOMEN:  soft, non-tender, non-distented, + BS, no hepatosplenomegaly   EXTREMITIES: no rashes, extremities warm/dry, no cyanosis, right JORGE LUIS in ankle, no ulcerations or ecchymosis  NERVOUS SYSTEM:  A&Ox3, CNII-XII intace, follows commands, answers questions appropriately   SKIN: No rashes or lesions           LABS:                        8.9    2.75  )-----------( 160      ( 02 May 2025 06:32 )             26.9     05-02    120[L]  |  88[L]  |  9[L]  ----------------------------<  87  4.5   |  24  |  0.6[L]    Ca    8.6      02 May 2025 06:32  Mg     1.8     05-02    TPro  6.2  /  Alb  4.1  /  TBili  <0.2  /  DBili  x   /  AST  21  /  ALT  13  /  AlkPhos  84  05-01      Urinalysis Basic - ( 02 May 2025 06:32 )    Color: clear / Appearance: x / SG: x / pH: x  Gluc: 87 mg/dL / Ketone: x  / Bili: x / Urobili: x   Blood: x / Protein: x / Nitrite: negative  Leuk Esterase: x / RBC: x / WBC x   Sq Epi: x / Non Sq Epi: x / Bacteria: x        Lactate, Blood: 0.7 mmol/L (05-01-25 @ 19:15)          RADIOLOGY:    RADIOLOGY       24H events:    Patient is a 75y old Female who presents with a chief complaint of dizziness   Primary diagnosis of Hyponatremia    Supriya Rocha is a 75 year old female who presented yesterday due to dizziness. She takes Lasix for HTN and Tadalfil?? She reports that it was over the past few hours and accompanied by nonbilious vomiting following excessive drinking. Prior to admission she was taking Bactrim and Keflex for a UTI. Patient states she in general does not eat well. Labs showed hyponatremia of 116/117 with hypochloremia of 83 her creatinine was 0.6 and BUN of 9. She was started on 3% Na+ fluid resuscitation which was stopped this morning. Na+ has since risen to 120 and Cl- to 88. Urine Sodium is 68 and 336 urine osmolality    Today is hospital day 1d. This morning patient was seen and examined at bedside, sitting comfortably in a chair.    No acute or major events overnight. Hemodynamically stable, tolerating oral diet, voiding appropriately, mild dysuria with appropriate bowel movements.         PAST MEDICAL & SURGICAL HISTORY  pulmonary HTN  Scleroderma  RA  SOCIAL HISTORY:  Social History:      ALLERGIES:  penicillin (Rash)  vancomycin (Anaphylaxis)    MEDICATIONS:  STANDING MEDICATIONS  ambrisentan 10 milliGRAM(s) Oral daily  cefTRIAXone   IVPB 1000 milliGRAM(s) IV Intermittent every 24 hours  chlorhexidine 2% Cloths 1 Application(s) Topical <User Schedule>  enoxaparin Injectable 40 milliGRAM(s) SubCutaneous every 24 hours  fluticasone propionate/ salmeterol 100-50 MICROgram(s) Diskus 1 Dose(s) Inhalation two times a day  hydroxychloroquine 200 milliGRAM(s) Oral daily  tiotropium 2.5 MICROgram(s) Inhaler 2 Puff(s) Inhalation daily    PRN MEDICATIONS  acetaminophen     Tablet .. 650 milliGRAM(s) Oral every 6 hours PRN  ALPRAZolam 0.25 milliGRAM(s) Oral two times a day PRN  ondansetron Injectable 4 milliGRAM(s) IV Push every 6 hours PRN    VITALS:   T(F): 96.8  HR: 70  BP: 118/54  RR: 18  SpO2: 98%    PHYSICAL EXAM:  GENERAL: NAD, sitting in a chair comfortably, cooperative,   HEAD: NCAD, no hematoma or laceration   NECK: Supple, no neck stiffness/nuchal rigidity, no JVD    HEART: Regular rate and rhythm, normal S1/S2, no murmurs, heaves, thrills  LUNGS: No acute respiratory distress, clear b/l breath sounds, no wheezing, rales, rhonchi  ABDOMEN:  soft, non-tender, non-distented, + BS, no hepatosplenomegaly   EXTREMITIES: no rashes, extremities warm/dry, no cyanosis, right JORGE LUIS in ankle, no ulcerations or ecchymosis  NERVOUS SYSTEM:  A&Ox3, CNII-XII intace, follows commands, answers questions appropriately   SKIN: No rashes or lesions           LABS:                        8.9    2.75  )-----------( 160      ( 02 May 2025 06:32 )             26.9     05-02    120[L]  |  88[L]  |  9[L]  ----------------------------<  87  4.5   |  24  |  0.6[L]    Ca    8.6      02 May 2025 06:32  Mg     1.8     05-02    TPro  6.2  /  Alb  4.1  /  TBili  <0.2  /  DBili  x   /  AST  21  /  ALT  13  /  AlkPhos  84  05-01      Urinalysis Basic - ( 02 May 2025 06:32 )    Color: clear / Appearance: x / SG: x / pH: 6.0  Gluc: 87 mg/dL / Ketone: negative / Bili: x / Urobili: x   Blood: negative / Protein: x / Nitrite: negative  Leuk Esterase: x / RBC: x / WBC x   Sq Epi: x / Non Sq Epi: x / Bacteria: x        Lactate, Blood: 0.7 mmol/L (05-01-25 @ 19:15)          RADIOLOGY:    RADIOLOGY       renal consultation     Patient is a 75y old Female who presents with a chief complaint of dizziness   Primary diagnosis of Hyponatremia    Supriya Rocha is a 75 year old female who presented yesterday due to dizziness. She takes Lasix for HTN and Tadalfil?? She reports that it was over the past few hours and accompanied by nonbilious vomiting following excessive drinking. Prior to admission she was taking Bactrim and Keflex for a UTI. Patient states she in general does not eat well. Labs showed hyponatremia of 116/117 with hypochloremia of 83 her creatinine was 0.6 and BUN of 9. She was started on 3% Na+ fluid resuscitation which was stopped this morning. Na+ has since risen to 120 and Cl- to 88. Urine Sodium is 68 and 336 urine osmolality    Today is hospital day 1d. This morning patient was seen and examined at bedside, sitting comfortably in a chair.    No acute or major events overnight. Hemodynamically stable, tolerating oral diet, voiding appropriately, mild dysuria with appropriate bowel movements.         PAST MEDICAL & SURGICAL HISTORY  pulmonary HTN  Scleroderma  RA  SOCIAL HISTORY:  Social History:      ALLERGIES:  penicillin (Rash)  vancomycin (Anaphylaxis)    MEDICATIONS:  STANDING MEDICATIONS  ambrisentan 10 milliGRAM(s) Oral daily  cefTRIAXone   IVPB 1000 milliGRAM(s) IV Intermittent every 24 hours  chlorhexidine 2% Cloths 1 Application(s) Topical <User Schedule>  enoxaparin Injectable 40 milliGRAM(s) SubCutaneous every 24 hours  fluticasone propionate/ salmeterol 100-50 MICROgram(s) Diskus 1 Dose(s) Inhalation two times a day  hydroxychloroquine 200 milliGRAM(s) Oral daily  tiotropium 2.5 MICROgram(s) Inhaler 2 Puff(s) Inhalation daily    PRN MEDICATIONS  acetaminophen     Tablet .. 650 milliGRAM(s) Oral every 6 hours PRN  ALPRAZolam 0.25 milliGRAM(s) Oral two times a day PRN  ondansetron Injectable 4 milliGRAM(s) IV Push every 6 hours PRN    VITALS:   T(F): 96.8  HR: 70  BP: 118/54  RR: 18  SpO2: 98%    PHYSICAL EXAM:  GENERAL: NAD, sitting in a chair comfortably, cooperative,   HEAD: NCAD, no hematoma or laceration   NECK: Supple, no neck stiffness/nuchal rigidity, no JVD    HEART: Regular rate and rhythm, normal S1/S2, no murmurs, heaves, thrills  LUNGS: No acute respiratory distress, clear b/l breath sounds, no wheezing, rales, rhonchi  ABDOMEN:  soft, non-tender, non-distented, + BS, no hepatosplenomegaly   EXTREMITIES: no rashes, extremities warm/dry, no cyanosis, right JORGE LUIS in ankle, no ulcerations or ecchymosis  NERVOUS SYSTEM:  A&Ox3, CNII-XII intace, follows commands, answers questions appropriately   SKIN: No rashes or lesions           LABS:                        8.9    2.75  )-----------( 160      ( 02 May 2025 06:32 )             26.9     05-02    120[L]  |  88[L]  |  9[L]  ----------------------------<  87  4.5   |  24  |  0.6[L]  SODIUM TREND:  Sodium 122 [05-02 @ 11:23]  Sodium 120 [05-02 @ 06:32]  Sodium 117 [05-02 @ 03:15]  Sodium 120 [05-01 @ 22:23]  Sodium 116 [05-01 @ 19:15]    Ca    8.6      02 May 2025 06:32  Mg     1.8     05-02    TPro  6.2  /  Alb  4.1  /  TBili  <0.2  /  DBili  x   /  AST  21  /  ALT  13  /  AlkPhos  84  05-01      Urinalysis Basic - ( 02 May 2025 06:32 )    Color: clear / Appearance: x / SG: x / pH: 6.0  Gluc: 87 mg/dL / Ketone: negative / Bili: x / Urobili: x   Blood: negative / Protein: x / Nitrite: negative  Leuk Esterase: x / RBC: x / WBC x   Sq Epi: x / Non Sq Epi: x / Bacteria: x    Osmolality, Random Urine: 336 mos/kg (05.01.25 @ 23:01)    Osmolality, Serum: 246 mos/kg (05.01.25 @ 22:23)    Sodium, Random Urine: 68.0: Reference Ranges have NOT been established for random urine analytes due  to variability in fluid intake and concentration. mmoL/L (05.01.25 @ 23:01)        Lactate, Blood: 0.7 mmol/L (05-01-25 @ 19:15)          RADIOLOGY:    RADIOLOGY

## 2025-05-02 NOTE — PATIENT PROFILE ADULT - FUNCTIONAL ASSESSMENT - DAILY ACTIVITY 6.
POC established and functional mobility goals were created to help pt return to PLOF. Will be reassessed as appropriate to measure pt progress.    Problem: Physical Therapy  Goal: Physical Therapy Goal  Description: Goals to be met by: 22    Patient will increase functional independence with mobility by performin. Supine to sit with [employer]  2. Sit to supine with [employer]  3. Sit to stand transfer with Supervision  4. Bed to chair transfer with Supervision using LRAD  5. Gait  x 100 feet with Supervision using LRAD.   6. Ascend/descend 3 stair with right Handrails and Supervision  7. Lower extremity exercise program x15 reps per handout, with supervision    Outcome: Ongoing, Progressing      4 = No assist / stand by assistance

## 2025-05-02 NOTE — PROGRESS NOTE ADULT - ASSESSMENT
75-year-old female with past medical history of pulm HTN, scleroderma, rheumatoid arthritis, presenting with dysuria, nausea and vomiting.  Patient reports urinary symptoms for the past few weeks. She states she has been on 3 different courses of abx, initially on Bactrim  then started on Keflex, still with some dysuria Day PTP, patient reports dizziness and lightheadedness, followed by nausea, 7/10 headache, lightheadedness, and blurry vision. Has been drinking 24-36 oz water daily. Admitted to SDU for symptomatic hyponatremia      Symptomatic Hyponatremia, likely med related ? was on bactrim before   On  Lasix and Spironolactone at home  - Na 116 on admission, unclear baseline; was 129 in Nov 2024, prior to that was 140s (Sept/Oct 2024)   - repeat Na 120 s/p 1L LR, received 50cc hypertonic saline   - holding home spironolactone 50mg daily and furosemide 20mg daily   - EKG NSR, no VA or QT changes  - CT head negative  - patient had some GI Symptoms ? gastroenteritis  - 11am BMP  - 4pm bmp  - fluid restriction to 1.5L per day      Dysuria - improved   - patient s/p 3 courses of OP abx, still with some dysuria  - Ucx from 12/2024 with E. Coli, resistant to cipro. Bactrim, and levaquin; sens to CTX  - UA neg in ED but likely sterile  - will continue on CTX for now, pending procal/UCx/BCx  - consider Urology eval due to persistent symptoms despite abx if no improvement in a few days     Hx  pulm HTN - CTD related   - co-managed with Dr. Farah and Dr. Alicia, also follows with Dr Pressley    - Echo 12/2024: EF 60%, mild MR, mild TR, PASP 44   - patient uses 3L NC at night   - cont ambrisentan 10mg daily  - cont home tadalafil 40mg daily (non-formulary)  - continue with Digoxin 0.125 daily.  Per patient, no known history of Afib   - holding home Lasix 20mg daily and spironolactone 50mg daily     Hx  Scleroderma   Hx  RA   - cont home hydroxychlorquine 200mg daily     DVT: lovenox  GI ppx: not indicated  Diet: regular with fluid restriction  Code: full    pending: antibiotics, q4 BMP,

## 2025-05-03 LAB
ALBUMIN SERPL ELPH-MCNC: 3.6 G/DL — SIGNIFICANT CHANGE UP (ref 3.5–5.2)
ALP SERPL-CCNC: 69 U/L — SIGNIFICANT CHANGE UP (ref 30–115)
ALT FLD-CCNC: 10 U/L — SIGNIFICANT CHANGE UP (ref 0–41)
ANION GAP SERPL CALC-SCNC: 13 MMOL/L — SIGNIFICANT CHANGE UP (ref 7–14)
ANION GAP SERPL CALC-SCNC: 8 MMOL/L — SIGNIFICANT CHANGE UP (ref 7–14)
AST SERPL-CCNC: 17 U/L — SIGNIFICANT CHANGE UP (ref 0–41)
BASOPHILS # BLD AUTO: 0.03 K/UL — SIGNIFICANT CHANGE UP (ref 0–0.2)
BASOPHILS NFR BLD AUTO: 1.1 % — HIGH (ref 0–1)
BILIRUB SERPL-MCNC: <0.2 MG/DL — SIGNIFICANT CHANGE UP (ref 0.2–1.2)
BUN SERPL-MCNC: 10 MG/DL — SIGNIFICANT CHANGE UP (ref 10–20)
BUN SERPL-MCNC: 12 MG/DL — SIGNIFICANT CHANGE UP (ref 10–20)
BUN SERPL-MCNC: 24 MG/DL — HIGH (ref 10–20)
BUN SERPL-MCNC: 25 MG/DL — HIGH (ref 10–20)
CALCIUM SERPL-MCNC: 8.7 MG/DL — SIGNIFICANT CHANGE UP (ref 8.4–10.5)
CALCIUM SERPL-MCNC: 8.7 MG/DL — SIGNIFICANT CHANGE UP (ref 8.4–10.5)
CALCIUM SERPL-MCNC: 9.1 MG/DL — SIGNIFICANT CHANGE UP (ref 8.4–10.5)
CALCIUM SERPL-MCNC: 9.3 MG/DL — SIGNIFICANT CHANGE UP (ref 8.4–10.5)
CHLORIDE SERPL-SCNC: 89 MMOL/L — LOW (ref 98–110)
CHLORIDE SERPL-SCNC: 90 MMOL/L — LOW (ref 98–110)
CHLORIDE SERPL-SCNC: 90 MMOL/L — LOW (ref 98–110)
CHLORIDE SERPL-SCNC: 91 MMOL/L — LOW (ref 98–110)
CO2 SERPL-SCNC: 22 MMOL/L — SIGNIFICANT CHANGE UP (ref 17–32)
CO2 SERPL-SCNC: 22 MMOL/L — SIGNIFICANT CHANGE UP (ref 17–32)
CO2 SERPL-SCNC: 23 MMOL/L — SIGNIFICANT CHANGE UP (ref 17–32)
CO2 SERPL-SCNC: 25 MMOL/L — SIGNIFICANT CHANGE UP (ref 17–32)
CORTIS AM PEAK SERPL-MCNC: 6.8 UG/DL — SIGNIFICANT CHANGE UP (ref 6–18.4)
CREAT SERPL-MCNC: 0.6 MG/DL — LOW (ref 0.7–1.5)
CULTURE RESULTS: NO GROWTH — SIGNIFICANT CHANGE UP
EGFR: 93 ML/MIN/1.73M2 — SIGNIFICANT CHANGE UP
EOSINOPHIL # BLD AUTO: 0.11 K/UL — SIGNIFICANT CHANGE UP (ref 0–0.7)
EOSINOPHIL NFR BLD AUTO: 3.9 % — SIGNIFICANT CHANGE UP (ref 0–8)
FERRITIN SERPL-MCNC: 25 NG/ML — SIGNIFICANT CHANGE UP (ref 13–330)
GLUCOSE SERPL-MCNC: 80 MG/DL — SIGNIFICANT CHANGE UP (ref 70–99)
GLUCOSE SERPL-MCNC: 87 MG/DL — SIGNIFICANT CHANGE UP (ref 70–99)
GLUCOSE SERPL-MCNC: 92 MG/DL — SIGNIFICANT CHANGE UP (ref 70–99)
GLUCOSE SERPL-MCNC: 94 MG/DL — SIGNIFICANT CHANGE UP (ref 70–99)
HCT VFR BLD CALC: 27 % — LOW (ref 37–47)
HGB BLD-MCNC: 8.9 G/DL — LOW (ref 12–16)
IMM GRANULOCYTES NFR BLD AUTO: 0.4 % — HIGH (ref 0.1–0.3)
LYMPHOCYTES # BLD AUTO: 0.61 K/UL — LOW (ref 1.2–3.4)
LYMPHOCYTES # BLD AUTO: 21.6 % — SIGNIFICANT CHANGE UP (ref 20.5–51.1)
MAGNESIUM SERPL-MCNC: 1.9 MG/DL — SIGNIFICANT CHANGE UP (ref 1.8–2.4)
MCHC RBC-ENTMCNC: 30.9 PG — SIGNIFICANT CHANGE UP (ref 27–31)
MCHC RBC-ENTMCNC: 33 G/DL — SIGNIFICANT CHANGE UP (ref 32–37)
MCV RBC AUTO: 93.8 FL — SIGNIFICANT CHANGE UP (ref 81–99)
MONOCYTES # BLD AUTO: 0.38 K/UL — SIGNIFICANT CHANGE UP (ref 0.1–0.6)
MONOCYTES NFR BLD AUTO: 13.4 % — HIGH (ref 1.7–9.3)
NEUTROPHILS # BLD AUTO: 1.69 K/UL — SIGNIFICANT CHANGE UP (ref 1.4–6.5)
NEUTROPHILS NFR BLD AUTO: 59.6 % — SIGNIFICANT CHANGE UP (ref 42.2–75.2)
NRBC BLD AUTO-RTO: 0 /100 WBCS — SIGNIFICANT CHANGE UP (ref 0–0)
PLATELET # BLD AUTO: 152 K/UL — SIGNIFICANT CHANGE UP (ref 130–400)
PMV BLD: 10.3 FL — SIGNIFICANT CHANGE UP (ref 7.4–10.4)
POTASSIUM SERPL-MCNC: 4.1 MMOL/L — SIGNIFICANT CHANGE UP (ref 3.5–5)
POTASSIUM SERPL-MCNC: 4.4 MMOL/L — SIGNIFICANT CHANGE UP (ref 3.5–5)
POTASSIUM SERPL-MCNC: 4.5 MMOL/L — SIGNIFICANT CHANGE UP (ref 3.5–5)
POTASSIUM SERPL-MCNC: 4.5 MMOL/L — SIGNIFICANT CHANGE UP (ref 3.5–5)
POTASSIUM SERPL-SCNC: 4.1 MMOL/L — SIGNIFICANT CHANGE UP (ref 3.5–5)
POTASSIUM SERPL-SCNC: 4.4 MMOL/L — SIGNIFICANT CHANGE UP (ref 3.5–5)
POTASSIUM SERPL-SCNC: 4.5 MMOL/L — SIGNIFICANT CHANGE UP (ref 3.5–5)
POTASSIUM SERPL-SCNC: 4.5 MMOL/L — SIGNIFICANT CHANGE UP (ref 3.5–5)
PROT SERPL-MCNC: 5.5 G/DL — LOW (ref 6–8)
RBC # BLD: 2.88 M/UL — LOW (ref 4.2–5.4)
RBC # FLD: 15.1 % — HIGH (ref 11.5–14.5)
SODIUM SERPL-SCNC: 119 MMOL/L — CRITICAL LOW (ref 135–146)
SODIUM SERPL-SCNC: 121 MMOL/L — LOW (ref 135–146)
SODIUM SERPL-SCNC: 123 MMOL/L — LOW (ref 135–146)
SODIUM SERPL-SCNC: 126 MMOL/L — LOW (ref 135–146)
SPECIMEN SOURCE: SIGNIFICANT CHANGE UP
WBC # BLD: 2.83 K/UL — LOW (ref 4.8–10.8)
WBC # FLD AUTO: 2.83 K/UL — LOW (ref 4.8–10.8)

## 2025-05-03 PROCEDURE — 99233 SBSQ HOSP IP/OBS HIGH 50: CPT

## 2025-05-03 RX ORDER — UREA 40 G
15 VIAL (EA) INTRAVENOUS DAILY
Refills: 0 | Status: DISCONTINUED | OUTPATIENT
Start: 2025-05-03 | End: 2025-05-05

## 2025-05-03 RX ADMIN — Medication 0.25 MILLIGRAM(S): at 21:40

## 2025-05-03 RX ADMIN — Medication 1 GRAM(S): at 21:27

## 2025-05-03 RX ADMIN — Medication 1 DOSE(S): at 21:26

## 2025-05-03 RX ADMIN — Medication 1 GRAM(S): at 17:03

## 2025-05-03 RX ADMIN — CEFTRIAXONE 100 MILLIGRAM(S): 500 INJECTION, POWDER, FOR SOLUTION INTRAMUSCULAR; INTRAVENOUS at 21:34

## 2025-05-03 RX ADMIN — TIOTROPIUM BROMIDE INHALATION SPRAY 2 PUFF(S): 3.12 SPRAY, METERED RESPIRATORY (INHALATION) at 09:18

## 2025-05-03 RX ADMIN — Medication 1 DOSE(S): at 11:03

## 2025-05-03 RX ADMIN — HYDROXYCHLOROQUINE SULFATE 200 MILLIGRAM(S): 200 TABLET, FILM COATED ORAL at 11:03

## 2025-05-03 RX ADMIN — Medication 1 GRAM(S): at 08:01

## 2025-05-03 RX ADMIN — Medication 15 GRAM(S): at 11:03

## 2025-05-03 RX ADMIN — Medication 1 APPLICATION(S): at 05:28

## 2025-05-03 RX ADMIN — AMBRISENTAN 10 MILLIGRAM(S): 5 TABLET, FILM COATED ORAL at 11:32

## 2025-05-03 NOTE — PROGRESS NOTE ADULT - ASSESSMENT
75-year-old female with past medical history of pulm HTN, scleroderma, rheumatoid arthritis, presenting with dysuria, nausea and vomiting.  Patient reports urinary symptoms for the past few weeks. She states she has been on 3 different courses of abx, initially on Bactrim  then started on Keflex, still with some dysuria Day PTP, patient reports dizziness and lightheadedness, followed by nausea, 7/10 headache, lightheadedness, and blurry vision. Has been drinking 24-36 oz water daily. Admitted to SDU for symptomatic hyponatremia      Symptomatic Hyponatremia, likely med related ? was on bactrim before   On  Lasix and Spironolactone at home  - Na 116 on admission, unclear baseline; was 129 in Nov 2024, prior to that was 140s (Sept/Oct 2024)   - Na slowly improving off IVF. Start salt tabs per nephrology   - holding home spironolactone 50mg daily and furosemide 20mg daily   - EKG NSR, no IN or QT changes  - CTH no acute pathology   - Serial BMPs q8h. Do not over correct (<8-10 in 24 hours).     Dysuria - improved   - patient s/p 3 courses of OP abx, still with some dysuria  - Ucx from 12/2024 with E. Coli, resistant to cipro. Bactrim, and levaquin; sens to CTX  - UA neg in ED but likely sterile  - will continue on CTX for 3 days. Ucx and blood cx NGTD  - consider URology eval inpt versus OP due to persistent symptoms despite abx if no improvement in a few days     Hx  pulm HTN - CTD related   - co-managed with Dr. Farah and Dr. Alicia, also follows with Dr Pressley    - Echo 12/2024: EF 60%, mild MR, mild TR, PASP 44   - patient uses 3L NC at night   - cont ambrisentan 10mg daily  - cont home tadalafil 40mg daily (non-formulary)  - continue with Digoxin 0.125 daily.  Per patient, no known history of Afib   - holding home Lasix 20mg daily and spironolactone 50mg daily     Hx  Scleroderma   Hx  RA   - cont home hydroxychlorquine 200mg daily     All discussed with patient and son at bedside, can be downgraded if Na remains stable     Patient seen at bedside, total time spent to evaluate and treat the patient's acute illness and chronic medical conditions as well as time spent reviewing prior records, labs, radiology, documenting in electronic medical records,  discussing medical plan wit medical team was 50 minutes with >50% of time spent face to face with patient, discussing with patient/family as well as coordination of care

## 2025-05-03 NOTE — PROGRESS NOTE ADULT - SUBJECTIVE AND OBJECTIVE BOX
HERMANNOLIVIALUCIA  75y Female    CHIEF COMPLAINT:    Patient is a 75y old  Female who presents with a chief complaint of     INTERVAL HPI/OVERNIGHT EVENTS:    Patient seen and examined. No acute events overnight. Comfortable on 3L NC    ROS: All other systems are negative.    Vital Signs:    T(F): 97.1 (25 @ 07:10), Max: 98.6 (25 @ 00:08)  HR: 74 (25 @ 07:10) (64 - 74)  BP: 122/59 (25 @ 07:10) (106/53 - 144/64)  RR: 18 (25 @ 07:10) (18 - 20)  SpO2: 97% (25 @ 07:10) (97% - 100%)    02 May 2025 07:01  -  03 May 2025 07:00  --------------------------------------------------------  IN: 935 mL / OUT: 551 mL / NET: 384 mL    Daily Weight in k.1 (03 May 2025 04:00)    PHYSICAL EXAM:    GENERAL:  NAD  SKIN: stable facial rash  HEENT: Atraumatic. Normocephalic.   NECK: Supple, No JVD.    PULMONARY: Coarse breath sounds B/L. No wheezing. No rales  CVS: Normal S1, S2. Rate and Rhythm are regular.  ABDOMEN/GI: Soft, Nontender, Nondistended  MSK:  No clubbing or cyanosis   NEUROLOGIC: Moves all extremities   PSYCH: Alert & oriented x 3, normal affect    Consultant(s) Notes Reviewed:  [x ] YES  [ ] NO  Care Discussed with Consultants/Other Providers [ x] YES  [ ] NO    LABS:                        8.9    2.83  )-----------( 152      ( 03 May 2025 05:37 )             27.0     121[L]  |  90[L]  |  10  ----------------------------<  80  4.4   |  23  |  0.6[L]    Ca    8.7      03 May 2025 05:37  Mg     1.9     -    TPro  5.5[L]  /  Alb  3.6  /  TBili  <0.2  /  DBili  x   /  AST  17  /  ALT  10  /  AlkPhos  69  -03    Culture - Blood (collected 01 May 2025 19:15)  Source: Blood Blood-Peripheral  Preliminary Report (03 May 2025 03:02):    No growth at 24 hours    Culture - Urine (collected 01 May 2025 19:15)  Source: Clean Catch Clean Catch (Midstream)  Final Report (03 May 2025 08:01):    No growth    Culture - Blood (collected 01 May 2025 19:00)  Source: Blood Blood-Peripheral  Preliminary Report (03 May 2025 03:02):    No growth at 24 hours    RADIOLOGY & ADDITIONAL TESTS:  Imaging or report Personally Reviewed:  [x] YES  [ ] NO  EKG reviewed: [x] YES  [ ] NO    Medications:  Standing  ambrisentan 10 milliGRAM(s) Oral daily  cefTRIAXone   IVPB 1000 milliGRAM(s) IV Intermittent every 24 hours  chlorhexidine 2% Cloths 1 Application(s) Topical <User Schedule>  enoxaparin Injectable 40 milliGRAM(s) SubCutaneous every 24 hours  fluticasone propionate/ salmeterol 100-50 MICROgram(s) Diskus 1 Dose(s) Inhalation two times a day  hydroxychloroquine 200 milliGRAM(s) Oral daily  sodium chloride 1 Gram(s) Oral every 8 hours  tiotropium 2.5 MICROgram(s) Inhaler 2 Puff(s) Inhalation daily    PRN Meds  acetaminophen     Tablet .. 650 milliGRAM(s) Oral every 6 hours PRN  ALPRAZolam 0.25 milliGRAM(s) Oral two times a day PRN  ondansetron Injectable 4 milliGRAM(s) IV Push every 6 hours PRN

## 2025-05-03 NOTE — PROGRESS NOTE ADULT - SUBJECTIVE AND OBJECTIVE BOX
Patient is a 75y old  Female who presents with a chief complaint of     INTERVAL HPI/OVERNIGHT EVENTS: no overnight events    acetaminophen     Tablet .. 650 milliGRAM(s) Oral every 6 hours PRN  ALPRAZolam 0.25 milliGRAM(s) Oral two times a day PRN  ambrisentan 10 milliGRAM(s) Oral daily  cefTRIAXone   IVPB 1000 milliGRAM(s) IV Intermittent every 24 hours  chlorhexidine 2% Cloths 1 Application(s) Topical <User Schedule>  enoxaparin Injectable 40 milliGRAM(s) SubCutaneous every 24 hours  fluticasone propionate/ salmeterol 100-50 MICROgram(s) Diskus 1 Dose(s) Inhalation two times a day  hydroxychloroquine 200 milliGRAM(s) Oral daily  ondansetron Injectable 4 milliGRAM(s) IV Push every 6 hours PRN  sodium chloride 1 Gram(s) Oral every 8 hours  tiotropium 2.5 MICROgram(s) Inhaler 2 Puff(s) Inhalation daily  urea Oral Powder 15 Gram(s) Oral daily      REVIEW OF SYSTEMS:  CONSTITUTIONAL: No fever, chills  EYES: No eye pain, visual disturbances, or discharge  ENMT:  No difficulty hearing, tinnitus, vertigo; No sinus or throat pain  RESPIRATORY: No cough, wheezing, chills or hemoptysis; No shortness of breath  CARDIOVASCULAR: No chest pain, palpitations  GASTROINTESTINAL: No abdominal pain. No nausea, vomiting, or diarrhea  GENITOURINARY: No dysuria  NEUROLOGICAL: No HA  SKIN: No itching, burning, rashes, or lesions   ENDOCRINE: No heat or cold intolerance; No hair loss  PSYCHIATRIC: No depression, anxiety, mood swings, or difficulty sleeping      T(C): 36.2 (05-03-25 @ 07:10), Max: 37 (05-03-25 @ 00:08)  HR: 74 (05-03-25 @ 07:10) (64 - 74)  BP: 122/59 (05-03-25 @ 07:10) (106/53 - 144/64)  RR: 18 (05-03-25 @ 07:10) (18 - 20)  SpO2: 97% (05-03-25 @ 07:10) (97% - 100%)  Wt(kg): --Vital Signs Last 24 Hrs  T(C): 36.2 (03 May 2025 07:10), Max: 37 (03 May 2025 00:08)  T(F): 97.1 (03 May 2025 07:10), Max: 98.6 (03 May 2025 00:08)  HR: 74 (03 May 2025 07:10) (64 - 74)  BP: 122/59 (03 May 2025 07:10) (106/53 - 144/64)  BP(mean): 84 (03 May 2025 07:10) (76 - 92)  RR: 18 (03 May 2025 07:10) (18 - 20)  SpO2: 97% (03 May 2025 07:10) (97% - 100%)    Parameters below as of 03 May 2025 07:10  Patient On (Oxygen Delivery Method): nasal cannula        PHYSICAL EXAM:  GENERAL: NAD, well-groomed, well-developed  HEAD:  Atraumatic, Normocephalic  EYES: EOMI, PERRLA, conjunctiva and sclera clear  ENMT: No tonsillar erythema, exudates, or enlargement; Moist mucous membranes  NECK: Supple, No JVD, Normal thyroid  CHEST/LUNG: Clear to auscultation bilaterally; No rales, rhonchi, wheezing, or rubs  HEART: Regular rate and rhythm; No murmurs, rubs, or gallops  ABDOMEN: Soft, Nontender, Nondistended; Bowel sounds present  NEURO: Alert & Oriented X3  EXTREMITIES: No LE edema, no calf tenderness  LYMPH: No lymphadenopathy noted  SKIN: No rashes or lesions    Consultant(s) Notes Reviewed:  [x ] YES  [ ] NO  Care Discussed with Consultants/Other Providers [ x] YES  [ ] NO    LABS:                        8.9    2.83  )-----------( 152      ( 03 May 2025 05:37 )             27.0     05-03    121[L]  |  90[L]  |  10  ----------------------------<  80  4.4   |  23  |  0.6[L]    Ca    8.7      03 May 2025 05:37  Mg     1.9     05-03    TPro  5.5[L]  /  Alb  3.6  /  TBili  <0.2  /  DBili  x   /  AST  17  /  ALT  10  /  AlkPhos  69  05-03      Urinalysis Basic - ( 03 May 2025 05:37 )    Color: x / Appearance: x / SG: x / pH: x  Gluc: 80 mg/dL / Ketone: x  / Bili: x / Urobili: x   Blood: x / Protein: x / Nitrite: x   Leuk Esterase: x / RBC: x / WBC x   Sq Epi: x / Non Sq Epi: x / Bacteria: x      CAPILLARY BLOOD GLUCOSE            Urinalysis Basic - ( 03 May 2025 05:37 )    Color: x / Appearance: x / SG: x / pH: x  Gluc: 80 mg/dL / Ketone: x  / Bili: x / Urobili: x   Blood: x / Protein: x / Nitrite: x   Leuk Esterase: x / RBC: x / WBC x   Sq Epi: x / Non Sq Epi: x / Bacteria: x          RADIOLOGY & ADDITIONAL TESTS:    Imaging Personally Reviewed:  [ ] YES  [ ] NO

## 2025-05-03 NOTE — PROGRESS NOTE ADULT - SUBJECTIVE AND OBJECTIVE BOX
Patient is a 75y old  Female who presents with a chief complaint of       Over Night Events:    No events overnight     ROS:     All ROS are negative except HPI         PHYSICAL EXAM    ICU Vital Signs Last 24 Hrs  T(C): 36.9 (03 May 2025 04:00), Max: 37 (03 May 2025 00:08)  T(F): 98.4 (03 May 2025 04:00), Max: 98.6 (03 May 2025 00:08)  HR: 64 (03 May 2025 04:00) (64 - 74)  BP: 116/56 (03 May 2025 04:00) (106/53 - 144/64)  BP(mean): 80 (03 May 2025 04:00) (75 - 92)  ABP: --  ABP(mean): --  RR: 18 (03 May 2025 04:00) (18 - 20)  SpO2: 97% (03 May 2025 04:00) (97% - 100%)    O2 Parameters below as of 03 May 2025 04:00  Patient On (Oxygen Delivery Method): nasal cannula  O2 Flow (L/min): 3          CONSTITUTIONAL:   NAD    ENT:   Airway patent,   Mouth with normal mucosa.   No thrush    EYES:   Pupils equal,   Round and reactive to light.    CARDIAC:   Normal rate,   Regular rhythm.    No edema          RESPIRATORY:   No wheezing  Bilateral BS  No use of accessory muscles    GASTROINTESTINAL:  Abdomen soft,   Non-tender,   No guarding,   + BS      NEUROLOGICAL:   Alert and oriented   No motor  deficits.    SKIN:   Skin normal color for race,   Warm and dry and intact.   No evidence of rash.        05-02-25 @ 07:01  -  05-03-25 @ 07:00  --------------------------------------------------------  IN:    Oral Fluid: 560 mL    sodium chloride 0.9%: 375 mL  Total IN: 935 mL    OUT:    Voided (mL): 551 mL  Total OUT: 551 mL    Total NET: 384 mL          LABS:                            8.9    2.83  )-----------( 152      ( 03 May 2025 05:37 )             27.0                                               05-03    119[LL]  |  89[L]  |  12  ----------------------------<  87  4.5   |  22  |  0.6[L]    Ca    8.7      03 May 2025 00:23  Mg     1.8     05-02    TPro  6.2  /  Alb  4.1  /  TBili  <0.2  /  DBili  x   /  AST  21  /  ALT  13  /  AlkPhos  84  05-01                                             Urinalysis Basic - ( 03 May 2025 00:23 )    Color: x / Appearance: x / SG: x / pH: x  Gluc: 87 mg/dL / Ketone: x  / Bili: x / Urobili: x   Blood: x / Protein: x / Nitrite: x   Leuk Esterase: x / RBC: x / WBC x   Sq Epi: x / Non Sq Epi: x / Bacteria: x                                                  LIVER FUNCTIONS - ( 01 May 2025 19:15 )  Alb: 4.1 g/dL / Pro: 6.2 g/dL / ALK PHOS: 84 U/L / ALT: 13 U/L / AST: 21 U/L / GGT: x                                                  Culture - Blood (collected 01 May 2025 19:15)  Source: Blood Blood-Peripheral  Preliminary Report (03 May 2025 03:02):    No growth at 24 hours    Culture - Blood (collected 01 May 2025 19:00)  Source: Blood Blood-Peripheral  Preliminary Report (03 May 2025 03:02):    No growth at 24 hours                                                                                           MEDICATIONS  (STANDING):  ambrisentan 10 milliGRAM(s) Oral daily  cefTRIAXone   IVPB 1000 milliGRAM(s) IV Intermittent every 24 hours  chlorhexidine 2% Cloths 1 Application(s) Topical <User Schedule>  enoxaparin Injectable 40 milliGRAM(s) SubCutaneous every 24 hours  fluticasone propionate/ salmeterol 100-50 MICROgram(s) Diskus 1 Dose(s) Inhalation two times a day  hydroxychloroquine 200 milliGRAM(s) Oral daily  tiotropium 2.5 MICROgram(s) Inhaler 2 Puff(s) Inhalation daily    MEDICATIONS  (PRN):  acetaminophen     Tablet .. 650 milliGRAM(s) Oral every 6 hours PRN Temp greater or equal to 38C (100.4F), Mild Pain (1 - 3)  ALPRAZolam 0.25 milliGRAM(s) Oral two times a day PRN for anxiety  ondansetron Injectable 4 milliGRAM(s) IV Push every 6 hours PRN Nausea and/or Vomiting      New X-rays reviewed:                                                                                  ECHO    CXR interpreted by me:       Patient is a 75y old  Female who presents with a chief complaint of Symptomatic hyponatremia       Over Night Events:  dropped sodium  IV fluids stopped   Now on salt tabs     No events overnight     ROS:     All ROS are negative except HPI         PHYSICAL EXAM    ICU Vital Signs Last 24 Hrs  T(C): 36.9 (03 May 2025 04:00), Max: 37 (03 May 2025 00:08)  T(F): 98.4 (03 May 2025 04:00), Max: 98.6 (03 May 2025 00:08)  HR: 64 (03 May 2025 04:00) (64 - 74)  BP: 116/56 (03 May 2025 04:00) (106/53 - 144/64)  BP(mean): 80 (03 May 2025 04:00) (75 - 92)  ABP: --  ABP(mean): --  RR: 18 (03 May 2025 04:00) (18 - 20)  SpO2: 97% (03 May 2025 04:00) (97% - 100%)    O2 Parameters below as of 03 May 2025 04:00  Patient On (Oxygen Delivery Method): nasal cannula  O2 Flow (L/min): 3          CONSTITUTIONAL:   NAD    ENT:   Airway patent,   Mouth with normal mucosa.   No thrush    EYES:   Pupils equal,   Round and reactive to light.    CARDIAC:   Normal rate,   Regular rhythm.    No edema          RESPIRATORY:   No wheezing  Bilateral BS  No use of accessory muscles    GASTROINTESTINAL:  Abdomen soft,   Non-tender,   No guarding,   + BS      NEUROLOGICAL:   Alert and oriented   No motor  deficits.    SKIN:   Skin normal color for race,   Warm and dry and intact.   No evidence of rash.        05-02-25 @ 07:01  -  05-03-25 @ 07:00  --------------------------------------------------------  IN:    Oral Fluid: 560 mL    sodium chloride 0.9%: 375 mL  Total IN: 935 mL    OUT:    Voided (mL): 551 mL  Total OUT: 551 mL    Total NET: 384 mL          LABS:                            8.9    2.83  )-----------( 152      ( 03 May 2025 05:37 )             27.0                                               05-03    119[LL]  |  89[L]  |  12  ----------------------------<  87  4.5   |  22  |  0.6[L]    Ca    8.7      03 May 2025 00:23  Mg     1.8     05-02    TPro  6.2  /  Alb  4.1  /  TBili  <0.2  /  DBili  x   /  AST  21  /  ALT  13  /  AlkPhos  84  05-01                                             Urinalysis Basic - ( 03 May 2025 00:23 )    Color: x / Appearance: x / SG: x / pH: x  Gluc: 87 mg/dL / Ketone: x  / Bili: x / Urobili: x   Blood: x / Protein: x / Nitrite: x   Leuk Esterase: x / RBC: x / WBC x   Sq Epi: x / Non Sq Epi: x / Bacteria: x                                                  LIVER FUNCTIONS - ( 01 May 2025 19:15 )  Alb: 4.1 g/dL / Pro: 6.2 g/dL / ALK PHOS: 84 U/L / ALT: 13 U/L / AST: 21 U/L / GGT: x                                                  Culture - Blood (collected 01 May 2025 19:15)  Source: Blood Blood-Peripheral  Preliminary Report (03 May 2025 03:02):    No growth at 24 hours    Culture - Blood (collected 01 May 2025 19:00)  Source: Blood Blood-Peripheral  Preliminary Report (03 May 2025 03:02):    No growth at 24 hours                                                                                           MEDICATIONS  (STANDING):  ambrisentan 10 milliGRAM(s) Oral daily  cefTRIAXone   IVPB 1000 milliGRAM(s) IV Intermittent every 24 hours  chlorhexidine 2% Cloths 1 Application(s) Topical <User Schedule>  enoxaparin Injectable 40 milliGRAM(s) SubCutaneous every 24 hours  fluticasone propionate/ salmeterol 100-50 MICROgram(s) Diskus 1 Dose(s) Inhalation two times a day  hydroxychloroquine 200 milliGRAM(s) Oral daily  tiotropium 2.5 MICROgram(s) Inhaler 2 Puff(s) Inhalation daily    MEDICATIONS  (PRN):  acetaminophen     Tablet .. 650 milliGRAM(s) Oral every 6 hours PRN Temp greater or equal to 38C (100.4F), Mild Pain (1 - 3)  ALPRAZolam 0.25 milliGRAM(s) Oral two times a day PRN for anxiety  ondansetron Injectable 4 milliGRAM(s) IV Push every 6 hours PRN Nausea and/or Vomiting      New X-rays reviewed:                                                                                  ECHO    CXR interpreted by me:

## 2025-05-03 NOTE — PROGRESS NOTE ADULT - ASSESSMENT
IMPRESSION:    Acute hyponatremia, diuretics use vs possible bactrim toxicity  UTI was treated outpatient   H/o pulmonary HTN  H/o RA  H/o scleroderma      PLAN:    CNS: Avoid depressants. CT noted    HEENT: Oral care    PULMONARY: Shes on RA.  HOB @ 45 degrees.  Aspiration precautions     CARDIOVASCULAR: Keep euvolemic.    GI: Feeding.  Bowel regimen PRN    RENAL: Avoid overcorrection. BMP q4. Nephrology follow up. Follow up lytes daily.  Correct as needed    INFECTIOUS DISEASE: Follow up cultures. Finish abx course    HEMATOLOGICAL:  DVT prophylaxis.    ENDOCRINE: Follow up FS. Insulin protocol if needed. avoid hypoglycemia follow up am cortisol     MUSCULOSKELETAL: AAT    SDU         IMPRESSION:    Acute hyponatremia, diuretics use vs possible bactrim toxicity  UTI was treated outpatient   H/o pulmonary HTN she is on home O2   H/o RA  H/o scleroderma      PLAN:    CNS: Avoid depressants. CT noted -ve     HEENT: Oral care    PULMONARY: C/w baseline o2.  HOB @ 45 degrees.  Aspiration precautions     CARDIOVASCULAR: Keep euvolemic.    GI: Feeding.  Bowel regimen PRN    RENAL: Avoid overcorrection. BMP q4. Nephrology follow up. Follow up lytes daily.  Correct as needed    INFECTIOUS DISEASE: Follow up cultures. Finish abx course    HEMATOLOGICAL:  DVT prophylaxis.    ENDOCRINE: Follow up FS. Insulin protocol if needed. avoid hypoglycemia follow up am cortisol     MUSCULOSKELETAL: AAT    SDU Possible downgrade          IMPRESSION:    Acute hyponatremia, diuretics use vs possible bactrim toxicity  UTI was treated outpatient   H/o pulmonary HTN she is on home O2   H/o RA  H/o scleroderma      PLAN:    CNS: Avoid depressants. CT noted -ve     HEENT: Oral care    PULMONARY: C/w baseline o2.  HOB @ 45 degrees.  Aspiration precautions     CARDIOVASCULAR: Keep euvolemic.    GI: Feeding.  Bowel regimen PRN    RENAL: Avoid overcorrection. BMP q4. Nephrology follow up. Follow up lytes daily.  Correct as needed. Salt tabs.     INFECTIOUS DISEASE: Follow up cultures. Finish abx course    HEMATOLOGICAL:  DVT prophylaxis.    ENDOCRINE: Follow up FS. Insulin protocol if needed. avoid hypoglycemia follow up am cortisol     MUSCULOSKELETAL: AAT    SDU pending improvement in Na level.

## 2025-05-03 NOTE — PROGRESS NOTE ADULT - ASSESSMENT
Supriya Rocha is a 75 year old female who presented yesterday due to dizziness. She takes Lasix for HTN and Tadalfil?? She reports that it was over the past few hours and accompanied by nonbilious vomiting following excessive drinking# hyponatremia / hyposmolar with hIgh U osm c/w ADH presence  sodium level noted start 3% at 25 cc/h for 5 h   avoid overcorrection // goal increase by 6 in 24 h  repeat sodium level q 8 h   start salt tablet q 8   TSH ok   will start urea in am if no improvement   avoid hypotonic infusions   repeat CBC today   will follow

## 2025-05-03 NOTE — PROGRESS NOTE ADULT - ASSESSMENT
75-year-old female with past medical history of pulm HTN, scleroderma, rheumatoid arthritis, presenting with dysuria, nausea and vomiting.  Patient reports urinary symptoms for the past few weeks. She states she has been on 3 different courses of abx, initially on Bactrim  then started on Keflex, still with some dysuria Day PTP, patient reports dizziness and lightheadedness, followed by nausea, 7/10 headache, lightheadedness, and blurry vision. Has been drinking 24-36 oz water daily. Admitted to SDU for symptomatic hyponatremia      Symptomatic Hyponatremia, likely med related ? was on bactrim before   On  Lasix and Spironolactone at home  - Na 116 on admission, unclear baseline; was 129 in Nov 2024, prior to that was 140s (Sept/Oct 2024)   - repeat Na 120 s/p 1L LR, received 50cc hypertonic saline   - holding home spironolactone 50mg daily and furosemide 20mg daily   - EKG NSR, no LA or QT changes  - CT head negative  - patient had some GI Symptoms ? gastroenteritis  - 11am BMP  - 4pm bmp  - fluid restriction to 1.5L per day  - as per nephro start salt tabs 1g q8  and urea 15 daily      Dysuria - improved   - patient s/p 3 courses of OP abx, still with some dysuria  - Ucx from 12/2024 with E. Coli, resistant to cipro. Bactrim, and levaquin; sens to CTX  - UA neg in ED but likely sterile  - will continue on CTX for now, pending procal/UCx/BCx  - consider Urology eval due to persistent symptoms despite abx if no improvement in a few days     Hx  pulm HTN - CTD related   - co-managed with Dr. Farah and Dr. Alicia, also follows with Dr Pressley    - Echo 12/2024: EF 60%, mild MR, mild TR, PASP 44   - patient uses 3L NC at night   - cont ambrisentan 10mg daily  - cont home tadalafil 40mg daily (non-formulary)  - continue with Digoxin 0.125 daily.  Per patient, no known history of Afib   - holding home Lasix 20mg daily and spironolactone 50mg daily     Hx  Scleroderma   Hx  RA   - cont home hydroxychlorquine 200mg daily     DVT: lovenox  GI ppx: not indicated  Diet: regular with fluid restriction  Code: full    pending: antibiotics, q4 BMP   75-year-old female with past medical history of pulm HTN, scleroderma, rheumatoid arthritis, presenting with dysuria, nausea and vomiting.  Patient reports urinary symptoms for the past few weeks. She states she has been on 3 different courses of abx, initially on Bactrim  then started on Keflex, still with some dysuria Day PTP, patient reports dizziness and lightheadedness, followed by nausea, 7/10 headache, lightheadedness, and blurry vision. Has been drinking 24-36 oz water daily. Admitted to SDU for symptomatic hyponatremia      Symptomatic Hyponatremia, likely med related ? was on bactrim before   On  Lasix and Spironolactone at home  - Na 116 on admission, unclear baseline; was 129 in Nov 2024, prior to that was 140s (Sept/Oct 2024)   - repeat Na 120 s/p 1L LR, received 50cc hypertonic saline   - holding home spironolactone 50mg daily and furosemide 20mg daily   - EKG NSR, no MO or QT changes  - CT head negative  - patient had some GI Symptoms ? gastroenteritis  - 11am BMP  - 4pm bmp  - fluid restriction to 1.5L per day  - as per nephro start salt tabs 1g q8  and urea 15 daily  - f/u serum Am cortisol      Dysuria - improved   - patient s/p 3 courses of OP abx, still with some dysuria  - Ucx from 12/2024 with E. Coli, resistant to cipro. Bactrim, and levaquin; sens to CTX  - UA neg in ED but likely sterile  - will continue on CTX for now, pending procal/UCx/BCx  - consider Urology eval due to persistent symptoms despite abx if no improvement in a few days     Hx  pulm HTN - CTD related   - co-managed with Dr. Farah and Dr. Alicia, also follows with Dr Pressley    - Echo 12/2024: EF 60%, mild MR, mild TR, PASP 44   - patient uses 3L NC at night   - cont ambrisentan 10mg daily  - cont home tadalafil 40mg daily (non-formulary)  - continue with Digoxin 0.125 daily.  Per patient, no known history of Afib   - holding home Lasix 20mg daily and spironolactone 50mg daily     Hx  Scleroderma   Hx  RA   - cont home hydroxychlorquine 200mg daily     DVT: lovenox  GI ppx: not indicated  Diet: regular with fluid restriction  Code: full    pending: antibiotics, q4 BMP

## 2025-05-03 NOTE — PROGRESS NOTE ADULT - SUBJECTIVE AND OBJECTIVE BOX
seen and examined  24 h events noted   no distress         PAST HISTORY  --------------------------------------------------------------------------------  No significant changes to PMH, PSH, FHx, SHx, unless otherwise noted    ALLERGIES & MEDICATIONS  --------------------------------------------------------------------------------  Allergies    penicillin (Rash)  vancomycin (Anaphylaxis)    Intolerances      Standing Inpatient Medications  ambrisentan 10 milliGRAM(s) Oral daily  cefTRIAXone   IVPB 1000 milliGRAM(s) IV Intermittent every 24 hours  chlorhexidine 2% Cloths 1 Application(s) Topical <User Schedule>  enoxaparin Injectable 40 milliGRAM(s) SubCutaneous every 24 hours  fluticasone propionate/ salmeterol 100-50 MICROgram(s) Diskus 1 Dose(s) Inhalation two times a day  hydroxychloroquine 200 milliGRAM(s) Oral daily  tiotropium 2.5 MICROgram(s) Inhaler 2 Puff(s) Inhalation daily    PRN Inpatient Medications  acetaminophen     Tablet .. 650 milliGRAM(s) Oral every 6 hours PRN  ALPRAZolam 0.25 milliGRAM(s) Oral two times a day PRN  ondansetron Injectable 4 milliGRAM(s) IV Push every 6 hours PRN        VITALS/PHYSICAL EXAM  --------------------------------------------------------------------------------  T(C): 36.9 (05-03-25 @ 04:00), Max: 37 (05-03-25 @ 00:08)  HR: 64 (05-03-25 @ 04:00) (64 - 74)  BP: 116/56 (05-03-25 @ 04:00) (106/53 - 144/64)  RR: 18 (05-03-25 @ 04:00) (18 - 20)  SpO2: 97% (05-03-25 @ 04:00) (97% - 100%)  Wt(kg): --  Height (cm): 154.9 (05-01-25 @ 17:57)  Weight (kg): 57.6 (05-01-25 @ 17:57)  BMI (kg/m2): 24 (05-01-25 @ 17:57)  BSA (m2): 1.56 (05-01-25 @ 17:57)      05-02-25 @ 07:01  -  05-03-25 @ 06:55  --------------------------------------------------------  IN: 935 mL / OUT: 551 mL / NET: 384 mL      Physical Exam:  	Gen: NAD  	Pulm: decrease BS  B/L  	CV:  S1S2; no rub  	Abd: + soft, nontender/nondistended  	LE: no edema      LABS/STUDIES  --------------------------------------------------------------------------------              8.9    2.75  >-----------<  160      [05-02-25 @ 06:32]              26.9     119  |  89  |  12  ----------------------------<  87      [05-03-25 @ 00:23]  4.5   |  22  |  0.6        Ca     8.7     [05-03-25 @ 00:23]      Mg     1.8     [05-02-25 @ 06:32]    TPro  6.2  /  Alb  4.1  /  TBili  <0.2  /  DBili  x   /  AST  21  /  ALT  13  /  AlkPhos  84  [05-01-25 @ 19:15]        Uric acid 2.8      [05-02-25 @ 17:29]  Serum Osmolality 246      [05-01-25 @ 22:23]    Creatinine Trend:  SCr 0.6 [05-03 @ 00:23]  SCr 0.6 [05-02 @ 21:51]  SCr 0.6 [05-02 @ 17:29]  SCr 0.6 [05-02 @ 11:23]  SCr 0.6 [05-02 @ 06:32]    Urinalysis - [05-03-25 @ 00:23]      Color  / Appearance  / SG  / pH       Gluc 87 / Ketone   / Bili  / Urobili        Blood  / Protein  / Leuk Est  / Nitrite       RBC  / WBC  / Hyaline  / Gran  / Sq Epi  / Non Sq Epi  / Bacteria     Urine Creatinine 46      [05-01-25 @ 23:01]  Urine Protein 8      [05-01-25 @ 23:01]  Urine Sodium 68.0      [05-01-25 @ 23:01]  Urine Urea Nitrogen 366      [05-01-25 @ 23:01]  Urine Potassium 20      [05-01-25 @ 23:01]  Urine Osmolality 336      [05-01-25 @ 23:01]    Iron 35, TIBC 305, %sat 11      [05-02-25 @ 17:29]  Ferritin 25      [05-02-25 @ 17:29]  TSH 2.51      [05-02-25 @ 17:29]

## 2025-05-04 LAB
ALBUMIN SERPL ELPH-MCNC: 3.7 G/DL — SIGNIFICANT CHANGE UP (ref 3.5–5.2)
ALP SERPL-CCNC: 69 U/L — SIGNIFICANT CHANGE UP (ref 30–115)
ALT FLD-CCNC: 10 U/L — SIGNIFICANT CHANGE UP (ref 0–41)
ANION GAP SERPL CALC-SCNC: 9 MMOL/L — SIGNIFICANT CHANGE UP (ref 7–14)
AST SERPL-CCNC: 14 U/L — SIGNIFICANT CHANGE UP (ref 0–41)
BASOPHILS # BLD AUTO: 0.03 K/UL — SIGNIFICANT CHANGE UP (ref 0–0.2)
BASOPHILS NFR BLD AUTO: 1 % — SIGNIFICANT CHANGE UP (ref 0–1)
BILIRUB SERPL-MCNC: <0.2 MG/DL — SIGNIFICANT CHANGE UP (ref 0.2–1.2)
BUN SERPL-MCNC: 19 MG/DL — SIGNIFICANT CHANGE UP (ref 10–20)
CALCIUM SERPL-MCNC: 8.8 MG/DL — SIGNIFICANT CHANGE UP (ref 8.4–10.5)
CHLORIDE SERPL-SCNC: 93 MMOL/L — LOW (ref 98–110)
CO2 SERPL-SCNC: 23 MMOL/L — SIGNIFICANT CHANGE UP (ref 17–32)
CREAT SERPL-MCNC: 0.5 MG/DL — LOW (ref 0.7–1.5)
EGFR: 97 ML/MIN/1.73M2 — SIGNIFICANT CHANGE UP
EGFR: 97 ML/MIN/1.73M2 — SIGNIFICANT CHANGE UP
EOSINOPHIL # BLD AUTO: 0.13 K/UL — SIGNIFICANT CHANGE UP (ref 0–0.7)
EOSINOPHIL NFR BLD AUTO: 4.2 % — SIGNIFICANT CHANGE UP (ref 0–8)
GLUCOSE SERPL-MCNC: 89 MG/DL — SIGNIFICANT CHANGE UP (ref 70–99)
HCT VFR BLD CALC: 26.5 % — LOW (ref 37–47)
HGB BLD-MCNC: 8.7 G/DL — LOW (ref 12–16)
IMM GRANULOCYTES NFR BLD AUTO: 0.3 % — SIGNIFICANT CHANGE UP (ref 0.1–0.3)
LYMPHOCYTES # BLD AUTO: 0.59 K/UL — LOW (ref 1.2–3.4)
LYMPHOCYTES # BLD AUTO: 19.2 % — LOW (ref 20.5–51.1)
MAGNESIUM SERPL-MCNC: 2.1 MG/DL — SIGNIFICANT CHANGE UP (ref 1.8–2.4)
MCHC RBC-ENTMCNC: 31.1 PG — HIGH (ref 27–31)
MCHC RBC-ENTMCNC: 32.8 G/DL — SIGNIFICANT CHANGE UP (ref 32–37)
MCV RBC AUTO: 94.6 FL — SIGNIFICANT CHANGE UP (ref 81–99)
MONOCYTES # BLD AUTO: 0.42 K/UL — SIGNIFICANT CHANGE UP (ref 0.1–0.6)
MONOCYTES NFR BLD AUTO: 13.7 % — HIGH (ref 1.7–9.3)
NEUTROPHILS # BLD AUTO: 1.89 K/UL — SIGNIFICANT CHANGE UP (ref 1.4–6.5)
NEUTROPHILS NFR BLD AUTO: 61.6 % — SIGNIFICANT CHANGE UP (ref 42.2–75.2)
NRBC BLD AUTO-RTO: 0 /100 WBCS — SIGNIFICANT CHANGE UP (ref 0–0)
PLATELET # BLD AUTO: 145 K/UL — SIGNIFICANT CHANGE UP (ref 130–400)
PMV BLD: 10.1 FL — SIGNIFICANT CHANGE UP (ref 7.4–10.4)
POTASSIUM SERPL-MCNC: 4.3 MMOL/L — SIGNIFICANT CHANGE UP (ref 3.5–5)
POTASSIUM SERPL-SCNC: 4.3 MMOL/L — SIGNIFICANT CHANGE UP (ref 3.5–5)
PROT SERPL-MCNC: 5.2 G/DL — LOW (ref 6–8)
RBC # BLD: 2.8 M/UL — LOW (ref 4.2–5.4)
RBC # FLD: 14.8 % — HIGH (ref 11.5–14.5)
SODIUM SERPL-SCNC: 125 MMOL/L — LOW (ref 135–146)
WBC # BLD: 3.07 K/UL — LOW (ref 4.8–10.8)
WBC # FLD AUTO: 3.07 K/UL — LOW (ref 4.8–10.8)

## 2025-05-04 PROCEDURE — 99232 SBSQ HOSP IP/OBS MODERATE 35: CPT

## 2025-05-04 PROCEDURE — 71045 X-RAY EXAM CHEST 1 VIEW: CPT | Mod: 26

## 2025-05-04 RX ORDER — BENZONATATE 100 MG
100 CAPSULE ORAL THREE TIMES A DAY
Refills: 0 | Status: DISCONTINUED | OUTPATIENT
Start: 2025-05-04 | End: 2025-05-05

## 2025-05-04 RX ADMIN — CEFTRIAXONE 100 MILLIGRAM(S): 500 INJECTION, POWDER, FOR SOLUTION INTRAMUSCULAR; INTRAVENOUS at 21:04

## 2025-05-04 RX ADMIN — Medication 1 GRAM(S): at 21:04

## 2025-05-04 RX ADMIN — HYDROXYCHLOROQUINE SULFATE 200 MILLIGRAM(S): 200 TABLET, FILM COATED ORAL at 11:09

## 2025-05-04 RX ADMIN — Medication 100 MILLIGRAM(S): at 23:18

## 2025-05-04 RX ADMIN — Medication 1 GRAM(S): at 13:31

## 2025-05-04 RX ADMIN — Medication 1 APPLICATION(S): at 05:15

## 2025-05-04 RX ADMIN — TIOTROPIUM BROMIDE INHALATION SPRAY 2 PUFF(S): 3.12 SPRAY, METERED RESPIRATORY (INHALATION) at 07:32

## 2025-05-04 RX ADMIN — AMBRISENTAN 10 MILLIGRAM(S): 5 TABLET, FILM COATED ORAL at 11:10

## 2025-05-04 RX ADMIN — Medication 0.25 MILLIGRAM(S): at 23:18

## 2025-05-04 RX ADMIN — Medication 1 DOSE(S): at 07:31

## 2025-05-04 RX ADMIN — Medication 15 GRAM(S): at 11:12

## 2025-05-04 RX ADMIN — Medication 1 GRAM(S): at 05:14

## 2025-05-04 RX ADMIN — Medication 1 DOSE(S): at 21:10

## 2025-05-04 NOTE — PROGRESS NOTE ADULT - ASSESSMENT
IMPRESSION:    Acute hyponatremia, diuretic vs possible bactrim toxicity  UTI was treated outpatient   H/o pulmonary HTN she is on home O2   H/o RA  H/o scleroderma      PLAN:    CNS: Avoid depressants. CT head -ve     HEENT: Oral care    PULMONARY: C/w baseline o2.  HOB @ 45 degrees.  Aspiration precautions     CARDIOVASCULAR: Keep euvolemic.    GI: Feeding.  Bowel regimen PRN    RENAL: Avoid overcorrection. BMP q4. Nephrology follow up. Follow up lytes daily.  Correct as needed. Salt tabs.     INFECTIOUS DISEASE: Follow up cultures. Finish abx course    HEMATOLOGICAL:  DVT prophylaxis.    ENDOCRINE: Follow up FS. Insulin protocol if needed. avoid hypoglycemia follow up am cortisol     MUSCULOSKELETAL: AAT    DGTF if Na stable  IMPRESSION:    Acute hyponatremia, diuretic vs possible bactrim toxicity  UTI was treated outpatient   H/o pulmonary HTN she is on home O2   H/o RA  H/o scleroderma      PLAN:    CNS: Avoid depressants. CT head -ve     HEENT: Oral care    PULMONARY: C/w baseline o2.  HOB @ 45 degrees.  Aspiration precautions, f/u CXR     CARDIOVASCULAR: Keep euvolemic.    GI: Feeding.  Bowel regimen PRN    RENAL: Avoid overcorrection. BMP q4. Nephrology follow up. Follow up lytes daily.  Correct as needed. Salt tabs.     INFECTIOUS DISEASE: Follow up cultures. Finish abx course, day 5/5 today     HEMATOLOGICAL:  DVT prophylaxis.    ENDOCRINE: Follow up FS. Insulin protocol if needed. Avoid hypoglycemia, follow up am cortisol     MUSCULOSKELETAL: AAT    DGTF if Na stable  IMPRESSION:    Acute hyponatremia, diuretic vs possible bactrim toxicity - better   UTI was treated outpatient   H/o pulmonary HTN she is on home O2   H/o RA  H/o scleroderma      PLAN:    CNS: Avoid depressants. CT head -ve     HEENT: Oral care    PULMONARY: C/w baseline o2.  HOB @ 45 degrees.  Aspiration precautions, f/u CXR     CARDIOVASCULAR: Keep euvolemic.    GI: Feeding.  Bowel regimen PRN    RENAL: Avoid overcorrection. BMP q4. Nephrology follow up. Follow up lytes daily.  Correct as needed. Salt tabs.     INFECTIOUS DISEASE: Follow up cultures. Finish abx course, day 5/5 today     HEMATOLOGICAL:  DVT prophylaxis.    ENDOCRINE: Follow up FS. Insulin protocol if needed. Avoid hypoglycemia, follow up am cortisol     MUSCULOSKELETAL: AAT    DGTF if Na stable; recall as needed

## 2025-05-04 NOTE — PROGRESS NOTE ADULT - ASSESSMENT
75-year-old female with past medical history of pulm HTN, scleroderma, rheumatoid arthritis, presenting with dysuria, nausea and vomiting.  Patient reports urinary symptoms for the past few weeks. She states she has been on 3 different courses of abx, initially on Bactrim  then started on Keflex, still with some dysuria Day PTP, patient reports dizziness and lightheadedness, followed by nausea, 7/10 headache, lightheadedness, and blurry vision. Has been drinking 24-36 oz water daily. Admitted to SDU for symptomatic hyponatremia      Symptomatic Hyponatremia, likely med related ? was on bactrim before   On  Lasix and Spironolactone at home  - Na 116 on admission, unclear baseline; was 129 in Nov 2024, prior to that was 140s (Sept/Oct 2024)   - Na slowly improving off IVF. Started salt tabs and urea powder per nephrology   - holding home spironolactone 50mg daily and furosemide 20mg daily   - EKG NSR, no PA or QT changes  - CTH no acute pathology   - Monitor BMPs    Dysuria - improved   - patient s/p 3 courses of OP abx, still with some dysuria  - Ucx from 12/2024 with E. Coli, resistant to cipro. Bactrim, and levaquin; sens to CTX  - UA neg in ED but likely sterile  - C/w CTX for 3 days. Ucx and blood cx NGTD  - consider Urology eval inpt versus OP due to persistent symptoms despite abx if no improvement in a few days     Hx  pulm HTN - CTD related   - co-managed with Dr. Farah and Dr. Alicia, also follows with Dr Pressley    - Echo 12/2024: EF 60%, mild MR, mild TR, PASP 44   - patient uses 3L NC at night   - cont ambrisentan 10mg daily  - cont home tadalafil 40mg daily (non-formulary)  - continue with Digoxin 0.125 daily.  Per patient, no known history of Afib   - holding home Lasix 20mg daily and spironolactone 50mg daily     Hx  Scleroderma   Hx  RA   - cont home hydroxychlorquine 200mg daily     All discussed with patient and son at bedside, can be downgraded to GMF  Pending: monitor Na, renal fu, dc CTX after today's dose, wean o2, ambulate as tolerated    Patient seen at bedside, total time spent to evaluate and treat the patient's acute illness and chronic medical conditions as well as time spent reviewing prior records, labs, radiology, documenting in electronic medical records,  discussing medical plan wit medical team was 45 minutes with >50% of time spent face to face with patient, discussing with patient/family as well as coordination of care

## 2025-05-04 NOTE — PROGRESS NOTE ADULT - SUBJECTIVE AND OBJECTIVE BOX
GENE LANGFORD  75y Female    CHIEF COMPLAINT:    Patient is a 75y old  Female who presents with a chief complaint of     INTERVAL HPI/OVERNIGHT EVENTS:    Patient seen and examined. No acute events overnight. Na improving, comfortable on NC     ROS: All other systems are negative.    Vital Signs:    T(F): 96.8 (25 @ 07:10), Max: 98.7 (25 @ 20:00)  HR: 78 (25 @ 07:10) (68 - 88)  BP: 121/58 (25 @ 07:10) (98/57 - 131/59)  RR: 18 (25 @ 07:10) (18 - 20)  SpO2: 98% (25 @ 07:10) (92% - 100%)    Daily Weight in k.1 (04 May 2025 01:00)    PHYSICAL EXAM:    GENERAL:  NAD  SKIN: No rashes or lesions  HEENT: Atraumatic. Normocephalic.   NECK: Supple, No JVD.  PULMONARY: Coarse breath sounds B/L. No wheezing    CVS: Normal S1, S2. Rate and Rhythm are regular.   ABDOMEN/GI: Soft, Nontender, Nondistended   MSK:  No clubbing or cyanosis   NEUROLOGIC: moves all extremities, follows commands   PSYCH: Alert & oriented x 3, normal affect    Consultant(s) Notes Reviewed:  [x ] YES  [ ] NO  Care Discussed with Consultants/Other Providers [ x] YES  [ ] NO    LABS:                        8.7    3.07  )-----------( 145      ( 04 May 2025 04:51 )             26.5     125[L]  |  93[L]  |  19  ----------------------------<  89  4.3   |  23  |  0.5[L]    Ca    8.8      04 May 2025 04:51  Mg     2.1     -    TPro  5.2[L]  /  Alb  3.7  /  TBili  <0.2  /  DBili  x   /  AST  14  /  ALT  10  /  AlkPhos  69  -    Culture - Blood (collected 01 May 2025 19:15)  Source: Blood Blood-Peripheral  Preliminary Report (04 May 2025 03:01):    No growth at 48 Hours    Culture - Urine (collected 01 May 2025 19:15)  Source: Clean Catch Clean Catch (Midstream)  Final Report (03 May 2025 08:01):    No growth    Culture - Blood (collected 01 May 2025 19:00)  Source: Blood Blood-Peripheral  Preliminary Report (04 May 2025 03:01):    No growth at 48 Hours    RADIOLOGY & ADDITIONAL TESTS:  Imaging or report Personally Reviewed:  [x] YES  [ ] NO  EKG reviewed: [x] YES  [ ] NO    Medications:  Standing  ambrisentan 10 milliGRAM(s) Oral daily  cefTRIAXone   IVPB 1000 milliGRAM(s) IV Intermittent every 24 hours  chlorhexidine 2% Cloths 1 Application(s) Topical <User Schedule>  enoxaparin Injectable 40 milliGRAM(s) SubCutaneous every 24 hours  fluticasone propionate/ salmeterol 100-50 MICROgram(s) Diskus 1 Dose(s) Inhalation two times a day  hydroxychloroquine 200 milliGRAM(s) Oral daily  sodium chloride 1 Gram(s) Oral every 8 hours  tiotropium 2.5 MICROgram(s) Inhaler 2 Puff(s) Inhalation daily  urea Oral Powder 15 Gram(s) Oral daily    PRN Meds  acetaminophen     Tablet .. 650 milliGRAM(s) Oral every 6 hours PRN  ALPRAZolam 0.25 milliGRAM(s) Oral two times a day PRN  ondansetron Injectable 4 milliGRAM(s) IV Push every 6 hours PRN

## 2025-05-04 NOTE — PROGRESS NOTE ADULT - SUBJECTIVE AND OBJECTIVE BOX
Patient is a 75y old  Female who presents with a chief complaint of       SUBJECTIVE: No acute events noted       REVIEW OF SYSTEMS:    All Pertinent ROS are negative except per HPI       PHYSICAL EXAM  Vital Signs Last 24 Hrs  T(C): 36.7 (04 May 2025 01:00), Max: 37.1 (03 May 2025 20:00)  T(F): 98 (04 May 2025 01:00), Max: 98.7 (03 May 2025 20:00)  HR: 68 (04 May 2025 01:00) (68 - 88)  BP: 131/59 (04 May 2025 01:00) (98/57 - 131/59)  BP(mean): 79 (03 May 2025 20:00) (79 - 85)  RR: 20 (04 May 2025 01:00) (18 - 20)  SpO2: 95% (04 May 2025 01:00) (92% - 100%)    Parameters below as of 03 May 2025 20:00  Patient On (Oxygen Delivery Method): room air        CONSTITUTIONAL:  Well nourished.  NAD    ENT:   Airway patent,   No thrush    CARDIAC:   Normal rate,   regular rhythm.      RESPIRATORY:   NO Wheezing  Normal chest expansion    GASTROINTESTINAL:  Abdomen soft,   non-tender,   no guarding,   + BS    MUSCULOSKELETAL:   range of motion is not limited,  no clubbing, cyanosis    NEUROLOGICAL:   Alert and oriented   no motor or deficits.      05-02-25 @ 07:01  -  05-03-25 @ 07:00  --------------------------------------------------------  IN:    Oral Fluid: 560 mL    sodium chloride 0.9%: 375 mL  Total IN: 935 mL    OUT:    Voided (mL): 551 mL  Total OUT: 551 mL    Total NET: 384 mL          LABS:                          8.7    3.07  )-----------( 145      ( 04 May 2025 04:51 )             26.5                                               05-04    125[L]  |  93[L]  |  19  ----------------------------<  89  4.3   |  23  |  0.5[L]    Ca    8.8      04 May 2025 04:51  Mg     2.1     05-04    TPro  5.2[L]  /  Alb  3.7  /  TBili  <0.2  /  DBili  x   /  AST  14  /  ALT  10  /  AlkPhos  69  05-04                                             Urinalysis Basic - ( 04 May 2025 04:51 )    Color: x / Appearance: x / SG: x / pH: x  Gluc: 89 mg/dL / Ketone: x  / Bili: x / Urobili: x   Blood: x / Protein: x / Nitrite: x   Leuk Esterase: x / RBC: x / WBC x   Sq Epi: x / Non Sq Epi: x / Bacteria: x                                                  LIVER FUNCTIONS - ( 04 May 2025 04:51 )  Alb: 3.7 g/dL / Pro: 5.2 g/dL / ALK PHOS: 69 U/L / ALT: 10 U/L / AST: 14 U/L / GGT: x                                                  Culture - Blood (collected 01 May 2025 19:15)  Source: Blood Blood-Peripheral  Preliminary Report (04 May 2025 03:01):    No growth at 48 Hours    Culture - Urine (collected 01 May 2025 19:15)  Source: Clean Catch Clean Catch (Midstream)  Final Report (03 May 2025 08:01):    No growth    Culture - Blood (collected 01 May 2025 19:00)  Source: Blood Blood-Peripheral  Preliminary Report (04 May 2025 03:01):    No growth at 48 Hours                                                    MEDICATIONS  (STANDING):  ambrisentan 10 milliGRAM(s) Oral daily  cefTRIAXone   IVPB 1000 milliGRAM(s) IV Intermittent every 24 hours  chlorhexidine 2% Cloths 1 Application(s) Topical <User Schedule>  enoxaparin Injectable 40 milliGRAM(s) SubCutaneous every 24 hours  fluticasone propionate/ salmeterol 100-50 MICROgram(s) Diskus 1 Dose(s) Inhalation two times a day  hydroxychloroquine 200 milliGRAM(s) Oral daily  sodium chloride 1 Gram(s) Oral every 8 hours  tiotropium 2.5 MICROgram(s) Inhaler 2 Puff(s) Inhalation daily  urea Oral Powder 15 Gram(s) Oral daily    MEDICATIONS  (PRN):  acetaminophen     Tablet .. 650 milliGRAM(s) Oral every 6 hours PRN Temp greater or equal to 38C (100.4F), Mild Pain (1 - 3)  ALPRAZolam 0.25 milliGRAM(s) Oral two times a day PRN for anxiety  ondansetron Injectable 4 milliGRAM(s) IV Push every 6 hours PRN Nausea and/or Vomiting      X-Rays reviewed

## 2025-05-04 NOTE — PROGRESS NOTE ADULT - SUBJECTIVE AND OBJECTIVE BOX
Nephrology progress note    Patient is seen and examined, events over the last 24 h noted .  lying in bed comfortable   no chest pain no SOB     Allergies:  penicillin (Rash)  vancomycin (Anaphylaxis)    Hospital Medications:   MEDICATIONS  (STANDING):  ambrisentan 10 milliGRAM(s) Oral daily  cefTRIAXone   IVPB 1000 milliGRAM(s) IV Intermittent every 24 hours  enoxaparin Injectable 40 milliGRAM(s) SubCutaneous every 24 hours  fluticasone propionate/ salmeterol 100-50 MICROgram(s) Diskus 1 Dose(s) Inhalation two times a day  hydroxychloroquine 200 milliGRAM(s) Oral daily  sodium chloride 1 Gram(s) Oral every 8 hours  tiotropium 2.5 MICROgram(s) Inhaler 2 Puff(s) Inhalation daily  urea Oral Powder 15 Gram(s) Oral daily        VITALS:  T(F): 96.9 (05-04-25 @ 11:33), Max: 98.7 (05-03-25 @ 20:00)  HR: 73 (05-04-25 @ 11:33)  BP: 122/57 (05-04-25 @ 11:33)  RR: 19 (05-04-25 @ 11:33)  SpO2: 99% (05-04-25 @ 11:33)      05-02 @ 07:01  -  05-03 @ 07:00  --------------------------------------------------------  IN: 935 mL / OUT: 551 mL / NET: 384 mL          PHYSICAL EXAM:  Constitutional: NAD  Respiratory: CTAB, no wheezes, rales or rhonchi  Cardiovascular: S1, S2, RRR  Gastrointestinal: BS+, soft, NT/ND  Extremities: No cyanosis or clubbing. No peripheral edema  :  No jimenez.   Skin: No rashes    LABS:  05-04    125[L]  |  93[L]  |  19  ----------------------------<  89  4.3   |  23  |  0.5[L]    Ca    8.8      04 May 2025 04:51  Mg     2.1     05-04    TPro  5.2[L]  /  Alb  3.7  /  TBili  <0.2  /  DBili      /  AST  14  /  ALT  10  /  AlkPhos  69  05-04                          8.7    3.07  )-----------( 145      ( 04 May 2025 04:51 )             26.5     Hemoglobin: 8.7 g/dL (05-04 @ 04:51)  Hemoglobin: 8.9 g/dL (05-03 @ 05:37)  Hemoglobin: 8.9 g/dL (05-02 @ 06:32)  Hemoglobin: 9.5 g/dL (05-01 @ 19:15)    Urine Studies:  Urinalysis Basic - ( 04 May 2025 04:51 )    Color:  / Appearance:  / SG:  / pH:   Gluc: 89 mg/dL / Ketone:   / Bili:  / Urobili:    Blood:  / Protein:  / Nitrite:    Leuk Esterase:  / RBC:  / WBC    Sq Epi:  / Non Sq Epi:  / Bacteria:       Sodium, Random Urine: 68.0 mmoL/L (05-01 @ 23:01)  Creatinine, Random Urine: 46 mg/dL (05-01 @ 23:01)  Protein/Creatinine Ratio Calculation: 0.2 Ratio (05-01 @ 23:01)  Osmolality, Random Urine: 336 mos/kg (05-01 @ 23:01)  Potassium, Random Urine: 20 mmol/L (05-01 @ 23:01)      Iron 35, TIBC 305, %sat 11      [05-02-25 @ 17:29]  Ferritin 25      [05-02-25 @ 17:29]  TSH 2.51      [05-02-25 @ 17:29]          RADIOLOGY & ADDITIONAL STUDIES:

## 2025-05-04 NOTE — PROGRESS NOTE ADULT - ASSESSMENT
Supriya Rocha is a 75 year old female who presented yesterday due to dizziness. She takes Lasix for HTN and Tadalfil?? She reports that it was over the past few hours and accompanied by nonbilious vomiting following excessive drinking    # hyponatremia / hyposmolar with hIgh U osm c/w ADH presence  no need for 3% saline   avoid overcorrection // goal increase by 6 in 24 h  repeat sodium level q 8 h   cont salt tablet q 8   TSH ok   continue urea sodium increase to 15 g po q12h   avoid hypotonic infusions   repeat CBC today Hb has been stable   will follow

## 2025-05-04 NOTE — PROGRESS NOTE ADULT - ATTENDING COMMENTS
IMPRESSION:    Acute hyponatremia, diuretic vs possible bactrim toxicity - better   UTI was treated outpatient   H/o pulmonary HTN she is on home O2   H/o RA  H/o scleroderma    Impression and plan above have been altered and are my own.

## 2025-05-05 ENCOUNTER — TRANSCRIPTION ENCOUNTER (OUTPATIENT)
Age: 75
End: 2025-05-05

## 2025-05-05 VITALS
SYSTOLIC BLOOD PRESSURE: 113 MMHG | TEMPERATURE: 98 F | HEART RATE: 84 BPM | RESPIRATION RATE: 18 BRPM | DIASTOLIC BLOOD PRESSURE: 68 MMHG | OXYGEN SATURATION: 92 %

## 2025-05-05 LAB
ANION GAP SERPL CALC-SCNC: 10 MMOL/L — SIGNIFICANT CHANGE UP (ref 7–14)
ANION GAP SERPL CALC-SCNC: 8 MMOL/L — SIGNIFICANT CHANGE UP (ref 7–14)
BUN SERPL-MCNC: 18 MG/DL — SIGNIFICANT CHANGE UP (ref 10–20)
BUN SERPL-MCNC: 19 MG/DL — SIGNIFICANT CHANGE UP (ref 10–20)
CALCIUM SERPL-MCNC: 9 MG/DL — SIGNIFICANT CHANGE UP (ref 8.4–10.5)
CALCIUM SERPL-MCNC: 9.4 MG/DL — SIGNIFICANT CHANGE UP (ref 8.4–10.5)
CHLORIDE SERPL-SCNC: 93 MMOL/L — LOW (ref 98–110)
CHLORIDE SERPL-SCNC: 96 MMOL/L — LOW (ref 98–110)
CO2 SERPL-SCNC: 24 MMOL/L — SIGNIFICANT CHANGE UP (ref 17–32)
CO2 SERPL-SCNC: 25 MMOL/L — SIGNIFICANT CHANGE UP (ref 17–32)
CREAT SERPL-MCNC: 0.5 MG/DL — LOW (ref 0.7–1.5)
CREAT SERPL-MCNC: 0.6 MG/DL — LOW (ref 0.7–1.5)
EGFR: 93 ML/MIN/1.73M2 — SIGNIFICANT CHANGE UP
EGFR: 93 ML/MIN/1.73M2 — SIGNIFICANT CHANGE UP
EGFR: 97 ML/MIN/1.73M2 — SIGNIFICANT CHANGE UP
EGFR: 97 ML/MIN/1.73M2 — SIGNIFICANT CHANGE UP
GLUCOSE SERPL-MCNC: 108 MG/DL — HIGH (ref 70–99)
GLUCOSE SERPL-MCNC: 89 MG/DL — SIGNIFICANT CHANGE UP (ref 70–99)
HCT VFR BLD CALC: 28.5 % — LOW (ref 37–47)
HGB BLD-MCNC: 9.2 G/DL — LOW (ref 12–16)
MAGNESIUM SERPL-MCNC: 1.9 MG/DL — SIGNIFICANT CHANGE UP (ref 1.8–2.4)
MCHC RBC-ENTMCNC: 30.8 PG — SIGNIFICANT CHANGE UP (ref 27–31)
MCHC RBC-ENTMCNC: 32.3 G/DL — SIGNIFICANT CHANGE UP (ref 32–37)
MCV RBC AUTO: 95.3 FL — SIGNIFICANT CHANGE UP (ref 81–99)
NRBC BLD AUTO-RTO: 0 /100 WBCS — SIGNIFICANT CHANGE UP (ref 0–0)
PLATELET # BLD AUTO: 146 K/UL — SIGNIFICANT CHANGE UP (ref 130–400)
PMV BLD: 10.1 FL — SIGNIFICANT CHANGE UP (ref 7.4–10.4)
POTASSIUM SERPL-MCNC: 3.9 MMOL/L — SIGNIFICANT CHANGE UP (ref 3.5–5)
POTASSIUM SERPL-MCNC: 4.3 MMOL/L — SIGNIFICANT CHANGE UP (ref 3.5–5)
POTASSIUM SERPL-SCNC: 3.9 MMOL/L — SIGNIFICANT CHANGE UP (ref 3.5–5)
POTASSIUM SERPL-SCNC: 4.3 MMOL/L — SIGNIFICANT CHANGE UP (ref 3.5–5)
RBC # BLD: 2.99 M/UL — LOW (ref 4.2–5.4)
RBC # FLD: 14.7 % — HIGH (ref 11.5–14.5)
SODIUM SERPL-SCNC: 125 MMOL/L — LOW (ref 135–146)
SODIUM SERPL-SCNC: 131 MMOL/L — LOW (ref 135–146)
WBC # BLD: 3.63 K/UL — LOW (ref 4.8–10.8)
WBC # FLD AUTO: 3.63 K/UL — LOW (ref 4.8–10.8)

## 2025-05-05 PROCEDURE — 99239 HOSP IP/OBS DSCHRG MGMT >30: CPT | Mod: GC

## 2025-05-05 RX ORDER — UREA 40 G
1 VIAL (EA) INTRAVENOUS
Qty: 14 | Refills: 0
Start: 2025-05-05 | End: 2025-05-18

## 2025-05-05 RX ORDER — SPIRONOLACTONE 25 MG
1 TABLET ORAL
Refills: 0 | DISCHARGE

## 2025-05-05 RX ORDER — FUROSEMIDE 10 MG/ML
1 INJECTION INTRAMUSCULAR; INTRAVENOUS
Refills: 0 | DISCHARGE

## 2025-05-05 RX ADMIN — Medication 100 MILLIGRAM(S): at 05:22

## 2025-05-05 RX ADMIN — Medication 1 GRAM(S): at 05:22

## 2025-05-05 RX ADMIN — AMBRISENTAN 10 MILLIGRAM(S): 5 TABLET, FILM COATED ORAL at 11:52

## 2025-05-05 RX ADMIN — Medication 15 GRAM(S): at 11:51

## 2025-05-05 RX ADMIN — Medication 1 APPLICATION(S): at 05:25

## 2025-05-05 RX ADMIN — HYDROXYCHLOROQUINE SULFATE 200 MILLIGRAM(S): 200 TABLET, FILM COATED ORAL at 11:50

## 2025-05-05 RX ADMIN — Medication 1 DOSE(S): at 11:53

## 2025-05-05 RX ADMIN — Medication 1 GRAM(S): at 14:39

## 2025-05-05 NOTE — PROGRESS NOTE ADULT - ASSESSMENT
Supriya Rocha is a 75 year old female who presented yesterday due to dizziness. She takes Lasix for HTN and Tadalfil?? She reports that it was over the past few hours and accompanied by nonbilious vomiting following excessive drinking    # hyponatremia / hyposmolar with hIgh U osm c/w inappropriate ADH presence  - limit fluid intake to 1.2L daily (no more than 5 cups of all fluids)  - d/c salt tabs  - cont urea  - TSH ok   will follow

## 2025-05-05 NOTE — DISCHARGE NOTE PROVIDER - HOSPITAL COURSE
75-year-old female with past medical history of pulm HTN, scleroderma, rheumatoid arthritis, presenting with dysuria, nausea and vomiting.  Patient reports urinary symptoms for the past few weeks. She states she has been on 3 different courses of abx, was on Bactrim about 8 days ago then started on Keflex for last 4 days, still with some dysuria. Patients states that yesterday she started feeling very dizzy and lightheaded, when she went to get up from her bed she felt like the floor was swaying beneath her and she had to hold on to something to walk to the bathroom. She called her PCP who told her to stop taking the antibiotic. She notes that today she woke up with nausea, 7/10 headache, lightheadedness, and blurry vision. She also notes dry cough that started around 3 days ago. She states she has had normal appetite and has been eating and drinking normally, reports she acutally has been drinking a little more water than usual because of her UTI (about 2-3 12oz water bottles a day). Patient reports episode of nausea and emesis in the ED, unable to hold down food and water today. She lives alone, independent in ADLs, occasionally uses cane. Otherwise denies fever, chills, chest pain, shortness of breath, abdominal pain, diarrhea, constipation, recent falls or trauma.     Vitals:   · BP Systolic	145 mm Hg  · BP Diastolic	65 mm Hg  · Heart Rate	73 /min  · Respiration Rate (breaths/min)	 70 /min (suspect inaccurate documentation)   · Temp (F)	98.4 Degrees F  · SpO2 (%)	94 % on RA     Labs:   WBC 3.54  Hgb 9.5  platelets 187  Na 116 (129 in Nov 2024, 140s prior to this)  K 4.2  Cr 0.6   UA neg     Imaging:   CXR: no focal consolidation, effusion, or PTX    Management in the ED:   CTX, 1L LR   Admitted to SDU for further monitoring. symptoms improved. completed rocephin course for uti. was on salt tabs and urea with fluid restriction for hyponatremia which is now 125. Joint decision making with the patient expressed desire to go home today; will dc with close follow up with primary care/ nephro.    Symptomatic hyponatremia / hyposmolar with hIgh U osm c/w ADH presence  On  Lasix and Spironolactone at home  - Na 116 on admission, unclear baseline; was 129 in Nov 2024, prior to that was 140s (Sept/Oct 2024)   - Na slowly improving off IVF. Started salt tabs and urea powder per nephrology   - holding home spironolactone 50mg daily and furosemide 20mg daily   - EKG NSR, no MT or QT changes  - CTH no acute pathology   - Monitor BMPs  - per nephro can dc w urea, no na tabs close follow up per pts decision    Dysuria - improved   - patient s/p 3 courses of OP abx, still with some dysuria  - Ucx from 12/2024 with E. Coli, resistant to cipro. Bactrim, and levaquin; sens to CTX  - UA neg in ED but likely sterile  - s/p rocephin course    Hx  pulm HTN - CTD related   - co-managed with Dr. Farah and Dr. Alicia, also follows with Dr Pressley    - Echo 12/2024: EF 60%, mild MR, mild TR, PASP 44   - patient uses 3L NC at night   - cont ambrisentan 10mg daily  - cont home tadalafil 40mg daily (non-formulary)  - continue with Digoxin 0.125 daily.  Per patient, no known history of Afib   - holding home Lasix 20mg daily and spironolactone 50mg daily     Hx  Scleroderma   Hx  RA   - cont home hydroxychlorquine 200mg daily     All discussed with patient and son at bedside, can be downgraded to GMF  Pending: monitor Na, renal fu, dc CTX after today's dose, wean o2, ambulate as tolerated    Discussion of discharge plan of care, including discharge diagnoses, medication reconciliation, and follow-ups was discussed with Dr. Pelletier on 5/5/25 and discharge was approved.   75-year-old female with past medical history of pulm HTN, scleroderma, rheumatoid arthritis, presenting with dysuria, nausea and vomiting.  Patient reports urinary symptoms for the past few weeks. She states she has been on 3 different courses of abx, was on Bactrim about 8 days ago then started on Keflex for last 4 days, still with some dysuria. Patients states that yesterday she started feeling very dizzy and lightheaded, when she went to get up from her bed she felt like the floor was swaying beneath her and she had to hold on to something to walk to the bathroom. She called her PCP who told her to stop taking the antibiotic. She notes that today she woke up with nausea, 7/10 headache, lightheadedness, and blurry vision. She also notes dry cough that started around 3 days ago. She states she has had normal appetite and has been eating and drinking normally, reports she acutally has been drinking a little more water than usual because of her UTI (about 2-3 12oz water bottles a day). Patient reports episode of nausea and emesis in the ED, unable to hold down food and water today. She lives alone, independent in ADLs, occasionally uses cane. Otherwise denies fever, chills, chest pain, shortness of breath, abdominal pain, diarrhea, constipation, recent falls or trauma.     Vitals:   · BP Systolic	145 mm Hg  · BP Diastolic	65 mm Hg  · Heart Rate	73 /min  · Respiration Rate (breaths/min)	 70 /min (suspect inaccurate documentation)   · Temp (F)	98.4 Degrees F  · SpO2 (%)	94 % on RA     Labs:   WBC 3.54  Hgb 9.5  platelets 187  Na 116 (129 in Nov 2024, 140s prior to this)  K 4.2  Cr 0.6   UA neg     Imaging:   CXR: no focal consolidation, effusion, or PTX    Management in the ED:   CTX, 1L LR   Admitted to SDU for further monitoring. symptoms improved. completed rocephin course for uti. was on salt tabs and urea with fluid restriction for hyponatremia which is now 125. Joint decision making with the patient expressed desire to go home today; will dc with close follow up with primary care/ nephro.    Symptomatic hyponatremia / hyposmolar with hIgh U osm c/w ADH presence  On  Lasix and Spironolactone at home  - Na 116 on admission, unclear baseline; was 129 in Nov 2024, prior to that was 140s (Sept/Oct 2024)   - Na slowly improving off IVF. Started salt tabs and urea powder per nephrology   - holding home spironolactone 50mg daily and furosemide 20mg daily   - EKG NSR, no MA or QT changes  - cortisol and TSH- normal  - CTH no acute pathology   - per nephro can dc w urea, no na tabs close follow up with Dr kleiner    Dysuria - improved   - patient s/p 3 courses of OP abx, still with some dysuria  - Ucx from 12/2024 with E. Coli, resistant to cipro. Bactrim, and levaquin; sens to CTX  - UA neg in ED but likely sterile  - s/p rocephin course    Hx  pulm HTN - CTD related   - co-managed with Dr. Farah and Dr. Alicia, also follows with Dr Pressley    - Echo 12/2024: EF 60%, mild MR, mild TR, PASP 44   - patient uses 3L NC at night   - cont ambrisentan 10mg daily  - cont home tadalafil 40mg daily (non-formulary)  - continue with Digoxin 0.125 daily.  Per patient, no known history of Afib   - holding home Lasix 20mg daily and spironolactone 50mg daily     Hx  Scleroderma - CREST   Hx  RA   - cont home hydroxychlorquine 200mg daily       Discussion of discharge plan of care, including discharge diagnoses, medication reconciliation, and follow-ups was discussed with Dr. Pelletier on 5/5/25 and discharge was approved.

## 2025-05-05 NOTE — PROGRESS NOTE ADULT - SUBJECTIVE AND OBJECTIVE BOX
Nephrology Progress Note    GENE LANGFORD  MRN-[id number]  75y  Female    S:  Patient is seen and examined, events over the last 24h noted.    O:  Allergies:  penicillin (Rash)  vancomycin (Anaphylaxis)    Hospital Medications:   MEDICATIONS  (STANDING):  ambrisentan 10 milliGRAM(s) Oral daily  chlorhexidine 2% Cloths 1 Application(s) Topical <User Schedule>  enoxaparin Injectable 40 milliGRAM(s) SubCutaneous every 24 hours  fluticasone propionate/ salmeterol 100-50 MICROgram(s) Diskus 1 Dose(s) Inhalation two times a day  hydroxychloroquine 200 milliGRAM(s) Oral daily  sodium chloride 1 Gram(s) Oral every 8 hours  tiotropium 2.5 MICROgram(s) Inhaler 2 Puff(s) Inhalation daily  urea Oral Powder 15 Gram(s) Oral daily    MEDICATIONS  (PRN):  acetaminophen     Tablet .. 650 milliGRAM(s) Oral every 6 hours PRN Temp greater or equal to 38C (100.4F), Mild Pain (1 - 3)  ALPRAZolam 0.25 milliGRAM(s) Oral two times a day PRN for anxiety  benzonatate 100 milliGRAM(s) Oral three times a day PRN Cough    Home Medications:  ambrisentan 10 mg oral tablet: 1 tab(s) orally once a day (01 May 2025 22:54)  digoxin 125 mcg (0.125 mg) oral tablet: 1 tab(s) orally once a day (01 May 2025 22:54)  Feosol 325 mg (65 mg elemental iron) oral tablet: 3 tab(s) orally every other day (01 May 2025 23:53)  hydroxychloroquine 200 mg oral tablet: 1 tab(s) orally once a day (02 May 2025 00:57)  Lasix 20 mg oral tablet: 1 tab(s) orally once a day (02 May 2025 00:57)  spironolactone 50 mg oral tablet: 1 tab(s) orally once a day (01 May 2025 22:53)  tadalafil 20 mg oral tablet: 2 tab(s) orally once a day (01 May 2025 23:49)  Trelegy Ellipta 100 mcg-62.5 mcg-25 mcg/inh inhalation powder: 1 puff(s) inhaled once a day (01 May 2025 23:50)  Xanax 0.25 mg oral tablet: 1 tab(s) orally 2 times a day as needed for  anxiety (01 May 2025 23:52)      VITALS:  Daily     Daily   T(F): 97.5 (05-05-25 @ 07:20), Max: 98.1 (05-04-25 @ 16:27)  HR: 84 (05-05-25 @ 07:20)  BP: 113/68 (05-05-25 @ 07:20)  RR: 18 (05-05-25 @ 07:20)  SpO2: 92% (05-05-25 @ 07:20)  Wt(kg): --  I&O's Detail    I&O's Summary        PHYSICAL EXAM:  Gen: NAD  Chest: b/l breath sounds  Abd: soft  Extremities: no edema  Vascular access:       LABS:    Blood Gas Profile w/Lytes - Venous: Performed in Lab (05-01-25 @ 19:48)  Blood Gas Venous - Sodium: 115 mmol/L (05-01-25 @ 19:48)  Blood Gas Venous - Potassium: 4.1 mmol/L (05-01-25 @ 19:48)    05-05    125[L]  |  93[L]  |  19  ----------------------------<  108[H]  3.9   |  24  |  0.6[L]    Ca    9.4      05 May 2025 08:26  Mg     1.9     05-05    TPro  5.2[L]  /  Alb  3.7  /  TBili  <0.2  /  DBili      /  AST  14  /  ALT  10  /  AlkPhos  69  05-04    eGFR: 93 mL/min/1.73m2 (05-05-25 @ 08:26)  eGFR: 97 mL/min/1.73m2 (05-04-25 @ 04:51)      Osmolality, Serum: 246 mos/kg (05-01-25 @ 22:23)                          9.2    3.63  )-----------( 146      ( 05 May 2025 08:26 )             28.5     Mean Cell Volume: 95.3 fL (05-05-25 @ 08:26)    Iron Total: 35 ug/dL (05-02-25 @ 17:29)  % Saturation, Iron: 11 % (05-02-25 @ 17:29)      % Saturation, Iron: 11 % (05-02-25 @ 17:29)    Urine Studies:  Protein, Urine: Negative mg/dL (05-01-25 @ 19:15)      Urea Nitrogen,  Random Urine: 366 mg/dL (05-01-25 @ 23:01)      Sodium: 125 mmol/L (05-05-25 @ 08:26)  Sodium: 125 mmol/L (05-04-25 @ 04:51)  Sodium: 126 mmol/L (05-03-25 @ 20:59)  Sodium: 123 mmol/L (05-03-25 @ 16:28)  Sodium: 121 mmol/L (05-03-25 @ 05:37)  Sodium: 119 mmol/L (05-03-25 @ 00:23)  Sodium: 119 mmol/L (05-02-25 @ 21:51)  Sodium: 121 mmol/L (05-02-25 @ 17:29)  Sodium: 122 mmol/L (05-02-25 @ 11:23)  Sodium: 120 mmol/L (05-02-25 @ 06:32)  Sodium: 117 mmol/L (05-02-25 @ 03:15)  Sodium: 120 mmol/L (05-01-25 @ 22:23)  Sodium: 116 mmol/L (05-01-25 @ 19:15)    Osmolality, Random Urine: 336 mos/kg [50 - 1200] (05-01-25 @ 23:01)    Sodium, Random Urine: 68.0 mmoL/L (05-01-25 @ 23:01)  Osmolality, Serum: 246 mos/kg [280 - 301] (05-01-25 @ 22:23)    Thyroid Stimulating Hormone, Serum: 2.51 uIU/mL [0.27 - 4.20] (05-02-25 @ 17:29)        US Retroperitoneal Complete:   ACC: [id number] EXAM:  US RETROPERITONEAL COMPLETE   ORDERED BY: MORTON KLEINER     PROCEDURE DATE:  04/24/2025          INTERPRETATION:  CLINICAL INFORMATION: Abdominal pain    COMPARISON: None available.    TECHNIQUE: Sonography of the kidneys and bladder.    FINDINGS:  Right kidney: 10.0 cm. No hydronephrosis or calculi. Partially exophytic   interpolar simple cyst measures 7.5 x 5.7 x 7.1 cm.    Left kidney: 9.3 cm. No hydronephrosis or calculi. Small cyst upper pole   measures 1.4 x 1.5 x 1.3 cm.    Urinary bladder: Urinary bladder volume 162 cc. Upon voiding, the urinary   bladder almost completely empty. No stones. Bilateral urinary jets.    IMPRESSION:  Bilateral renal cysts. Almost completely empty urinary bladder upon   voiding.        --- End of Report ---            MARILIN ACUNA MD; Attending Radiologist  This document has been electronically signed. Apr 24 2025 12:18PM (04-24-25 @ 11:57)       Nephrology Progress Note    GENE LANGFORD  MRN-[id number]  75y  Female    S:  Patient is seen and examined, events over the last 24h noted.    O:  Allergies:  penicillin (Rash)  vancomycin (Anaphylaxis)    Hospital Medications:   MEDICATIONS  (STANDING):  ambrisentan 10 milliGRAM(s) Oral daily  chlorhexidine 2% Cloths 1 Application(s) Topical <User Schedule>  enoxaparin Injectable 40 milliGRAM(s) SubCutaneous every 24 hours  fluticasone propionate/ salmeterol 100-50 MICROgram(s) Diskus 1 Dose(s) Inhalation two times a day  hydroxychloroquine 200 milliGRAM(s) Oral daily  sodium chloride 1 Gram(s) Oral every 8 hours  tiotropium 2.5 MICROgram(s) Inhaler 2 Puff(s) Inhalation daily  urea Oral Powder 15 Gram(s) Oral daily    MEDICATIONS  (PRN):  acetaminophen     Tablet .. 650 milliGRAM(s) Oral every 6 hours PRN Temp greater or equal to 38C (100.4F), Mild Pain (1 - 3)  ALPRAZolam 0.25 milliGRAM(s) Oral two times a day PRN for anxiety  benzonatate 100 milliGRAM(s) Oral three times a day PRN Cough    Home Medications:  ambrisentan 10 mg oral tablet: 1 tab(s) orally once a day (01 May 2025 22:54)  digoxin 125 mcg (0.125 mg) oral tablet: 1 tab(s) orally once a day (01 May 2025 22:54)  Feosol 325 mg (65 mg elemental iron) oral tablet: 3 tab(s) orally every other day (01 May 2025 23:53)  hydroxychloroquine 200 mg oral tablet: 1 tab(s) orally once a day (02 May 2025 00:57)  Lasix 20 mg oral tablet: 1 tab(s) orally once a day (02 May 2025 00:57)  spironolactone 50 mg oral tablet: 1 tab(s) orally once a day (01 May 2025 22:53)  tadalafil 20 mg oral tablet: 2 tab(s) orally once a day (01 May 2025 23:49)  Trelegy Ellipta 100 mcg-62.5 mcg-25 mcg/inh inhalation powder: 1 puff(s) inhaled once a day (01 May 2025 23:50)  Xanax 0.25 mg oral tablet: 1 tab(s) orally 2 times a day as needed for  anxiety (01 May 2025 23:52)      VITALS:  Daily     Daily   T(F): 97.5 (05-05-25 @ 07:20), Max: 98.1 (05-04-25 @ 16:27)  HR: 84 (05-05-25 @ 07:20)  BP: 113/68 (05-05-25 @ 07:20)  RR: 18 (05-05-25 @ 07:20)  SpO2: 92% (05-05-25 @ 07:20)  Wt(kg): --  I&O's Detail    I&O's Summary        PHYSICAL EXAM:  Gen: NAD  Chest: b/l breath sounds  Abd: soft  Extremities: no edema      LABS:    Blood Gas Profile w/Lytes - Venous: Performed in Lab (05-01-25 @ 19:48)  Blood Gas Venous - Sodium: 115 mmol/L (05-01-25 @ 19:48)  Blood Gas Venous - Potassium: 4.1 mmol/L (05-01-25 @ 19:48)    05-05    125[L]  |  93[L]  |  19  ----------------------------<  108[H]  3.9   |  24  |  0.6[L]    Ca    9.4      05 May 2025 08:26  Mg     1.9     05-05    TPro  5.2[L]  /  Alb  3.7  /  TBili  <0.2  /  DBili      /  AST  14  /  ALT  10  /  AlkPhos  69  05-04      Osmolality, Serum: 246 mos/kg (05-01-25 @ 22:23)                          9.2    3.63  )-----------( 146      ( 05 May 2025 08:26 )             28.5     Mean Cell Volume: 95.3 fL (05-05-25 @ 08:26)      Sodium: 125 mmol/L (05-05-25 @ 08:26)  Sodium: 125 mmol/L (05-04-25 @ 04:51)  Sodium: 126 mmol/L (05-03-25 @ 20:59)  Sodium: 123 mmol/L (05-03-25 @ 16:28)  Sodium: 121 mmol/L (05-03-25 @ 05:37)  Sodium: 119 mmol/L (05-03-25 @ 00:23)  Sodium: 119 mmol/L (05-02-25 @ 21:51)  Sodium: 121 mmol/L (05-02-25 @ 17:29)  Sodium: 122 mmol/L (05-02-25 @ 11:23)  Sodium: 120 mmol/L (05-02-25 @ 06:32)  Sodium: 117 mmol/L (05-02-25 @ 03:15)  Sodium: 120 mmol/L (05-01-25 @ 22:23)  Sodium: 116 mmol/L (05-01-25 @ 19:15)    Osmolality, Random Urine: 336 mos/kg [50 - 1200] (05-01-25 @ 23:01)    Sodium, Random Urine: 68.0 mmoL/L (05-01-25 @ 23:01)  Osmolality, Serum: 246 mos/kg [280 - 301] (05-01-25 @ 22:23)    Thyroid Stimulating Hormone, Serum: 2.51 uIU/mL [0.27 - 4.20] (05-02-25 @ 17:29)        US Retroperitoneal Complete:   ACC: [id number] EXAM:  US RETROPERITONEAL COMPLETE   ORDERED BY: MORTON KLEINER     PROCEDURE DATE:  04/24/2025          INTERPRETATION:  CLINICAL INFORMATION: Abdominal pain    COMPARISON: None available.    TECHNIQUE: Sonography of the kidneys and bladder.    FINDINGS:  Right kidney: 10.0 cm. No hydronephrosis or calculi. Partially exophytic   interpolar simple cyst measures 7.5 x 5.7 x 7.1 cm.    Left kidney: 9.3 cm. No hydronephrosis or calculi. Small cyst upper pole   measures 1.4 x 1.5 x 1.3 cm.    Urinary bladder: Urinary bladder volume 162 cc. Upon voiding, the urinary   bladder almost completely empty. No stones. Bilateral urinary jets.    IMPRESSION:  Bilateral renal cysts. Almost completely empty urinary bladder upon   voiding.        --- End of Report ---            MARILIN ACUNA MD; Attending Radiologist  This document has been electronically signed. Apr 24 2025 12:18PM (04-24-25 @ 11:57)

## 2025-05-05 NOTE — DISCHARGE NOTE PROVIDER - CARE PROVIDER_API CALL
Kleiner, Morton Jay  Nephrology  [address] [place] [id number]  Phone: [phone]  Fax: [phone]  Established Patient  Follow Up Time: 1-3 days

## 2025-05-05 NOTE — DISCHARGE NOTE PROVIDER - NSDCFUSCHEDAPPT_GEN_ALL_CORE_FT
Unknown, Doctor  [facility] PreAdmits  Scheduled Appointment: 05/20/2025    [facility] Physician [facility]  [address]  Scheduled Appointment: 05/20/2025    Mayra Matias  [facility] Physician [facility]  [address]  Scheduled Appointment: 06/20/2025

## 2025-05-05 NOTE — PROGRESS NOTE ADULT - PROVIDER SPECIALTY LIST ADULT
Critical Care
Internal Medicine
Nephrology
Pulmonology
Internal Medicine
Nephrology
Internal Medicine
Nephrology

## 2025-05-05 NOTE — DISCHARGE NOTE PROVIDER - NSDCCPCAREPLAN_GEN_ALL_CORE_FT
PRINCIPAL DISCHARGE DIAGNOSIS  Diagnosis: Hyponatremia  Assessment and Plan of Treatment: Hyponatremia, a condition characterized by low sodium levels in the blood. Blood tests are crucial to confirm hyponatremia, with serum sodium levels typically falling below 135 milliequivalents per liter (mEq/L) indicating the condition. Regular monitoring of sodium levels and patient symptoms is essential to prevent overly rapid correction.  Continue taking urea as prescribed. You are discharged with recent sodium level of 125. Please follow up with Dr. Kleiner for close management of hyponatremia. Continue fluid restriction to less than 1.5L per day      SECONDARY DISCHARGE DIAGNOSES  Diagnosis: Acute UTI  Assessment and Plan of Treatment:      PRINCIPAL DISCHARGE DIAGNOSIS  Diagnosis: Hyponatremia  Assessment and Plan of Treatment: Hyponatremia, a condition characterized by low sodium levels in the blood. Blood tests are crucial to confirm hyponatremia, with serum sodium levels typically falling below 135 milliequivalents per liter (mEq/L) indicating the condition. Regular monitoring of sodium levels and patient symptoms is essential to prevent overly rapid correction.  Continue taking urea as prescribed. You are discharged with recent sodium level of 125. Please follow up with Dr. Kleiner for close management of hyponatremia. Continue fluid restriction to less than 1.2L(4-5 cups) per day      SECONDARY DISCHARGE DIAGNOSES  Diagnosis: Acute UTI  Assessment and Plan of Treatment:

## 2025-05-05 NOTE — DISCHARGE NOTE PROVIDER - NSDCMRMEDTOKEN_GEN_ALL_CORE_FT
ambrisentan 10 mg oral tablet: 1 tab(s) orally once a day  digoxin 125 mcg (0.125 mg) oral tablet: 1 tab(s) orally once a day  Feosol 325 mg (65 mg elemental iron) oral tablet: 3 tab(s) orally every other day  hydroxychloroquine 200 mg oral tablet: 1 tab(s) orally once a day  tadalafil 20 mg oral tablet: 2 tab(s) orally once a day  Trelegy Ellipta 100 mcg-62.5 mcg-25 mcg/inh inhalation powder: 1 puff(s) inhaled once a day  urea 15 g oral powder for reconstitution: 1 packet(s) orally once a day  Xanax 0.25 mg oral tablet: 1 tab(s) orally 2 times a day as needed for  anxiety

## 2025-05-05 NOTE — DISCHARGE NOTE NURSING/CASE MANAGEMENT/SOCIAL WORK - FINANCIAL ASSISTANCE
[facility] provides services at a reduced cost to those who are determined to be eligible through [facility]’s financial assistance program. Information regarding [facility]’s financial assistance program can be found by going to [email or web address] or by calling [phone].

## 2025-05-05 NOTE — DISCHARGE NOTE PROVIDER - ATTENDING DISCHARGE PHYSICAL EXAMINATION:
PHYSICAL EXAM    GEN: no distress, comfortable  facila and hands telengiectasia  PULM: BS heard b/l equal, No wheezing  CVS: S1S2 present, no rubs or gallops  ABD: Soft, non-distended, no guarding; non-tender  EXT: No lower extremity edema  NEURO: A&Ox3, moving all extremities

## 2025-05-05 NOTE — DISCHARGE NOTE NURSING/CASE MANAGEMENT/SOCIAL WORK - PATIENT PORTAL LINK FT
You can access the FollowMyHealth Patient Portal offered by [facility] by registering at the following website: [email or web address]. By joining [employer]’s FollowMyHealth portal, you will also be able to view your health information using other applications (apps) compatible with our system.

## 2025-05-06 NOTE — CDI QUERY NOTE - NSCDIOTHERTXTBX_GEN_ALL_CORE_HH
There is an uncertain diagnosis documented in the medical record that has not been qualified as ruled in, ruled out or remaining uncertain at the time of discharge.     In order to ensure accurate coding and accuracy of the clinical record, the documentation in this patient’s medical record requires additional clarification.      The diagnosis of "SIADH?" and "ADH presence" has been documented in the medical record:    Please review the documentation in the medical record and clarify the appropriate diagnosis (along with any supporting documentation) in your progress note and/or discharge summary.    • SIADH has been ruled in.  • SIADH has been ruled out / was excluded at the time of discharge.  • SIADH remains probable, likely, suspected / could not be excluded at the time of discharge.  • Other (specify)    Supporting documentation and/or clinical evidence:     5/1 H&P Adult: ...  serum osm is low, urine sodium is >20 (but patient is on diuretics), urine osm is 300 - bactrim induced SIADH? ... Hyponatremia Hypoosmotic  Hypovolemia. Associated with nausea Vomiting. On  Lasix and Spironolactone ... 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  presented yesterday due to dizziness. She takes Lasix for HTN and Tadalfil?? She reports that it was over the past few hours and accompanied by nonbilious vomiting following excessive drinking. Prior to admission she was taking Bactrim and Keflex for a UTI. Patient states she in general does not eat well. Labs showed hyponatremia of 116/117 with hypochloremia of 83 her creatinine was 0.6 and BUN of 9. She was started on 3% Na+ fluid resuscitation which was stopped this morning. Na+ has since risen to 120 and Cl- to 88. Urine Sodium is 68 and 336 urine osmolality ... hyponatremia/ hypovolemia possibly SIADH ... Attestation Statements: ... hyponatremia / hyposmolar with hIgh U osm c/w ADH presence    5/4 Progress Note Adult-Internal Medicine Attending: ... Symptomatic Hyponatremia, likely med related ? was on bactrim before. On  Lasix and Spironolactone at home- Na 116 on admission, unclear baseline; was 129 in Nov 2024, prior to that was 140s (Sept/Oct 2024) - Na slowly improving off IVF. Started salt tabs and urea powder per nephrology - holding home spironolactone 50mg daily and furosemide 20mg daily    5/5 Progress Note Adult-Nephrology Attending: ... hyponatremia / hyposmolar with hIgh U osm c/w inappropriate ADH presence- limit fluid intake to 1.2L daily (no more than 5 cups of all fluids)- d/c salt tabs    5/5 Discharge Note Provider: ... Symptomatic hyponatremia / hyposmolar with hIgh U osm c/w ADH presence. On  Lasix and Spironolactone at home    Thank you,   Lashell Banerjee RN [facility] CCDS  [phone]

## 2025-05-07 LAB
CULTURE RESULTS: SIGNIFICANT CHANGE UP
CULTURE RESULTS: SIGNIFICANT CHANGE UP
SPECIMEN SOURCE: SIGNIFICANT CHANGE UP
SPECIMEN SOURCE: SIGNIFICANT CHANGE UP

## 2025-05-08 ENCOUNTER — APPOINTMENT (OUTPATIENT)
Dept: PULMONOLOGY | Facility: CLINIC | Age: 75
End: 2025-05-08

## 2025-05-10 DIAGNOSIS — Z99.81 DEPENDENCE ON SUPPLEMENTAL OXYGEN: ICD-10-CM

## 2025-05-10 DIAGNOSIS — D64.9 ANEMIA, UNSPECIFIED: ICD-10-CM

## 2025-05-10 DIAGNOSIS — Z88.0 ALLERGY STATUS TO PENICILLIN: ICD-10-CM

## 2025-05-10 DIAGNOSIS — E22.2 SYNDROME OF INAPPROPRIATE SECRETION OF ANTIDIURETIC HORMONE: ICD-10-CM

## 2025-05-10 DIAGNOSIS — E86.1 HYPOVOLEMIA: ICD-10-CM

## 2025-05-10 DIAGNOSIS — I08.1 RHEUMATIC DISORDERS OF BOTH MITRAL AND TRICUSPID VALVES: ICD-10-CM

## 2025-05-10 DIAGNOSIS — I27.20 PULMONARY HYPERTENSION, UNSPECIFIED: ICD-10-CM

## 2025-05-10 DIAGNOSIS — N39.0 URINARY TRACT INFECTION, SITE NOT SPECIFIED: ICD-10-CM

## 2025-05-10 DIAGNOSIS — I10 ESSENTIAL (PRIMARY) HYPERTENSION: ICD-10-CM

## 2025-05-10 DIAGNOSIS — Z88.1 ALLERGY STATUS TO OTHER ANTIBIOTIC AGENTS: ICD-10-CM

## 2025-05-10 DIAGNOSIS — M06.9 RHEUMATOID ARTHRITIS, UNSPECIFIED: ICD-10-CM

## 2025-05-10 DIAGNOSIS — M34.9 SYSTEMIC SCLEROSIS, UNSPECIFIED: ICD-10-CM

## 2025-05-20 ENCOUNTER — OUTPATIENT (OUTPATIENT)
Dept: OUTPATIENT SERVICES | Facility: HOSPITAL | Age: 75
LOS: 1 days | End: 2025-05-20
Payer: MEDICARE

## 2025-05-20 ENCOUNTER — RESULT REVIEW (OUTPATIENT)
Age: 75
End: 2025-05-20

## 2025-05-20 DIAGNOSIS — Z00.8 ENCOUNTER FOR OTHER GENERAL EXAMINATION: ICD-10-CM

## 2025-05-20 DIAGNOSIS — R06.02 SHORTNESS OF BREATH: ICD-10-CM

## 2025-05-20 PROCEDURE — 71250 CT THORAX DX C-: CPT

## 2025-05-20 PROCEDURE — 71250 CT THORAX DX C-: CPT | Mod: 26

## 2025-05-21 DIAGNOSIS — R06.02 SHORTNESS OF BREATH: ICD-10-CM

## 2025-06-03 ENCOUNTER — APPOINTMENT (OUTPATIENT)
Dept: PULMONOLOGY | Facility: CLINIC | Age: 75
End: 2025-06-03

## 2025-06-20 ENCOUNTER — APPOINTMENT (OUTPATIENT)
Dept: HEART FAILURE | Facility: CLINIC | Age: 75
End: 2025-06-20

## 2025-07-07 ENCOUNTER — NON-APPOINTMENT (OUTPATIENT)
Age: 75
End: 2025-07-07

## 2025-08-05 ENCOUNTER — RESULT REVIEW (OUTPATIENT)
Age: 75
End: 2025-08-05

## 2025-08-05 ENCOUNTER — OUTPATIENT (OUTPATIENT)
Dept: OUTPATIENT SERVICES | Facility: HOSPITAL | Age: 75
LOS: 1 days | End: 2025-08-05
Payer: MEDICARE

## 2025-08-05 DIAGNOSIS — J44.9 CHRONIC OBSTRUCTIVE PULMONARY DISEASE, UNSPECIFIED: ICD-10-CM

## 2025-08-05 DIAGNOSIS — Z00.8 ENCOUNTER FOR OTHER GENERAL EXAMINATION: ICD-10-CM

## 2025-08-05 PROCEDURE — 71250 CT THORAX DX C-: CPT | Mod: 26

## 2025-08-05 PROCEDURE — 71250 CT THORAX DX C-: CPT

## 2025-08-06 DIAGNOSIS — J44.9 CHRONIC OBSTRUCTIVE PULMONARY DISEASE, UNSPECIFIED: ICD-10-CM
